# Patient Record
Sex: MALE | Race: BLACK OR AFRICAN AMERICAN | Employment: OTHER | URBAN - METROPOLITAN AREA
[De-identification: names, ages, dates, MRNs, and addresses within clinical notes are randomized per-mention and may not be internally consistent; named-entity substitution may affect disease eponyms.]

---

## 2020-07-17 LAB — MICROALBUMIN UR TEST STR-MCNC: 21.2 MG/DL

## 2020-09-08 ENCOUNTER — OFFICE VISIT (OUTPATIENT)
Dept: ENDOCRINOLOGY | Age: 70
End: 2020-09-08
Payer: MEDICARE

## 2020-09-08 VITALS
SYSTOLIC BLOOD PRESSURE: 141 MMHG | HEART RATE: 96 BPM | BODY MASS INDEX: 31.19 KG/M2 | WEIGHT: 205.8 LBS | DIASTOLIC BLOOD PRESSURE: 92 MMHG | HEIGHT: 68 IN | OXYGEN SATURATION: 93 % | RESPIRATION RATE: 18 BRPM | TEMPERATURE: 98.1 F

## 2020-09-08 DIAGNOSIS — E78.2 MIXED HYPERLIPIDEMIA: ICD-10-CM

## 2020-09-08 DIAGNOSIS — E11.65 TYPE 2 DIABETES MELLITUS WITH HYPERGLYCEMIA, WITHOUT LONG-TERM CURRENT USE OF INSULIN (HCC): Primary | ICD-10-CM

## 2020-09-08 DIAGNOSIS — I10 ESSENTIAL HYPERTENSION: ICD-10-CM

## 2020-09-08 PROCEDURE — G8536 NO DOC ELDER MAL SCRN: HCPCS | Performed by: INTERNAL MEDICINE

## 2020-09-08 PROCEDURE — G8417 CALC BMI ABV UP PARAM F/U: HCPCS | Performed by: INTERNAL MEDICINE

## 2020-09-08 PROCEDURE — 3017F COLORECTAL CA SCREEN DOC REV: CPT | Performed by: INTERNAL MEDICINE

## 2020-09-08 PROCEDURE — 3046F HEMOGLOBIN A1C LEVEL >9.0%: CPT | Performed by: INTERNAL MEDICINE

## 2020-09-08 PROCEDURE — G8427 DOCREV CUR MEDS BY ELIG CLIN: HCPCS | Performed by: INTERNAL MEDICINE

## 2020-09-08 PROCEDURE — G8510 SCR DEP NEG, NO PLAN REQD: HCPCS | Performed by: INTERNAL MEDICINE

## 2020-09-08 PROCEDURE — 1101F PT FALLS ASSESS-DOCD LE1/YR: CPT | Performed by: INTERNAL MEDICINE

## 2020-09-08 PROCEDURE — 99204 OFFICE O/P NEW MOD 45 MIN: CPT | Performed by: INTERNAL MEDICINE

## 2020-09-08 PROCEDURE — 2022F DILAT RTA XM EVC RTNOPTHY: CPT | Performed by: INTERNAL MEDICINE

## 2020-09-08 RX ORDER — BLOOD SUGAR DIAGNOSTIC
STRIP MISCELLANEOUS
Qty: 100 STRIP | Refills: 3 | Status: SHIPPED | OUTPATIENT
Start: 2020-09-08 | End: 2022-07-07 | Stop reason: SDUPTHER

## 2020-09-08 RX ORDER — LANCETS 33 GAUGE
EACH MISCELLANEOUS
Qty: 100 LANCET | Refills: 3 | Status: SHIPPED | OUTPATIENT
Start: 2020-09-08

## 2020-09-08 NOTE — PROGRESS NOTES
Kt Cordero MD          Patient Information  Date:9/8/2020  Name : Josefa Blanco. 72 y.o.     YOB: 1950         Referred by: Katie Sheridan MD         Chief Complaint   Patient presents with    New Patient    Diabetes       History of Present Illness: Josefa Nevarez is a 72 y.o. male here for initial visit of  Type 2 Diabetes Mellitus. Type 2 Diabetes was diagnosed 2015. End organ effects of diabetes: none. Cardiovascular risk factors: dyslipidemia, diabetes mellitus   Monitoring frequency: None/day and readings run not applicable  Last O7S was 7 in July 2020, blood glucose was 250 and symptoms include no polyuria, polydipsia  Hypoglycemia: no  He is on metformin 5 mg twice daily, Januvia  Weight trend: stable  Prior visit with dietician: no  Current diet: \"healthy\" diet  , changed it recently after falling to have hyperglycemia, he has quit sodas    Current exercise: Resume soon  No chest pain, shortness of breath, blurred vision      Hypothyroidism: On levothyroxine    Wt Readings from Last 3 Encounters:   09/08/20 205 lb 12.8 oz (93.4 kg)   08/09/16 202 lb (91.6 kg)   10/24/13 202 lb 5 oz (91.8 kg)       BP Readings from Last 3 Encounters:   09/08/20 (!) 141/92   08/09/16 (!) 122/96   10/24/13 130/70           Past Medical History:   Diagnosis Date    Burning with urination     [facility] [address]     Diabetes mellitus type 2 with complications, uncontrolled [facility] [address]     Hypercholesterolemia     Hyperlipidemia, mixed     Hypertension     Hypothyroidism     Long term (current) use of anticoagulants     Thyroid disease      Current Outpatient Medications   Medication Sig    sitagliptin phosphate (JANUVIA PO) Take  by mouth.  aspirin 81 mg chewable tablet Take 81 mg by mouth daily.  levothyroxine (SYNTHROID) 150 mcg tablet Take 150 mcg by mouth Daily (before breakfast).  0.15 mg    lisinopril-hydrochlorothiazide (PRINZIDE, ZESTORETIC) 20-25 mg per tablet Take 1 Tab by mouth daily.  atorvastatin (LIPITOR) 40 mg tablet Take 40 mg by mouth daily.  metFORMIN (GLUCOPHAGE) 500 mg tablet Take 500 mg by mouth two (2) times a day.  glucose blood VI test strips (OneTouch Verio test strips) strip Test once daily Dx Code: E11.65    lancets (OneTouch Delica Plus Lancet) 33 gauge misc Test once daily Dx Code: E11.65    finasteride (PROSCAR) 5 mg tablet Take 5 mg by mouth daily.  TAMSULOSIN HCL (FLOMAX PO) Take 0.4 mg by mouth two (2) times a day. No current facility-administered medications for this visit. Allergies   Allergen Reactions    Aspirin Nausea Only    Nsaids (Non-Steroidal Anti-Inflammatory Drug) Nausea Only    Oxycodone Itching       Review of Systems:  All 10 systems reviewed and are negative other than mentioned in HPI    Physical Examination:   Blood pressure (!) 141/92, pulse 96, temperature 98.1 °F (36.7 °C), temperature source Oral, resp. rate 18, height 5' 8\" (1.727 m), weight 205 lb 12.8 oz (93.4 kg), SpO2 93 %. Estimated body mass index is 31.29 kg/m² as calculated from the following:    Height as of this encounter: 5' 8\" (1.727 m). -   Weight as of this encounter: 205 lb 12.8 oz (93.4 kg).   - General: pleasant, no distress, good eye contact  - HEENT: no pallor, no periorbital edema, EOMI  - Neck: supple, no thyromegaly, no nodules  - Cardiovascular: regular,  normal S1 and S2, no murmurs  - Respiratory: clear to auscultation bilaterally  - Gastrointestinal: soft, nontender, nondistended,  BS +  - Musculoskeletal: no proximal muscle weakness in upper or lower extremities  - Integumentary: no acanthosis nigricans,no edema,   - Neurological: alert and oriented  - Psychiatric: normal mood and affect  - Skin: color, texture, turgor normal.           Data Reviewed:       Lab Results   Component Value Date/Time    Glucose 144 (H) 06/13/2016 02:16 PM    Glucose (POC) 125 (H) 10/24/2013 09:30 AM Creatinine 1.32 (H) 06/13/2016 02:16 PM      Lab Results   Component Value Date/Time    GFR est non-AA 54 (L) 06/13/2016 02:16 PM    GFR est AA >60 06/13/2016 02:16 PM    Creatinine 1.32 (H) 06/13/2016 02:16 PM    BUN 12 06/13/2016 02:16 PM    Sodium 140 06/13/2016 02:16 PM    Potassium 3.1 (L) 06/13/2016 02:16 PM    Chloride 104 06/13/2016 02:16 PM    CO2 28 06/13/2016 02:16 PM       Assessment/Plan:     1. Type 2 diabetes mellitus with hyperglycemia, without long-term current use of insulin ([facility] [address]    2. Essential hypertension    3. Mixed hyperlipidemia        1. Type 2 Diabetes Mellitus   No results found for: HBA1C, HGBE8, VPC2WTMN, MFR8GDQA, FQL0OFLJ  Discussed the pathophysiology, he has already made changes to the diet which was commended  Increase activity  Continue the same medication metformin, Januvia  Goals of the blood glucose discussed, meter dispensed, to call in 2 weeks if above goal  Advised to check glucose 2  times daily    Diabetic issues reviewed : glycemic goals , written exchange diet given, low carbohydrate diet, weight control , home glucose monitoring emphasized,  hypoglycemia management and long term diabetic complications discussed. FLU annually ,Pneumovax ,aspirin daily,annual eye exam,microalbumin    2. HTN : Continue current therapy     3. Hyperlipidemia : Continue statin. 4.Obesity:Body mass index is 31.29 kg/m². Discussed about the importance of exercise and carbohydrate portion control. 5.  Hypothyroidism      There are no Patient Instructions on file for this visit. Follow-up and Dispositions    · Return in about 3 months (around 12/8/2020) for labs before next visit and follow up. Thank you for allowing me to participate in the care of this patient. Shelia Mercedes MD      Patient verbalized understanding     Voice-recognition software was used to generate this report, which may result in some phonetic-based errors in the grammar and contents.   Even though attempts were made to correct all the mistakes, some may have been missed and remained in the body of the report.

## 2020-09-08 NOTE — PROGRESS NOTES
1. Have you been to the ER, urgent care clinic since your last visit? No  Hospitalized since your last visit? No    2. Have you seen or consulted any other health care providers outside of the [address] [place] [address] since your last visit? Include any pap smears or colon screening. No    Wt Readings from Last 3 Encounters:   09/08/20 205 lb 12.8 oz (93.4 kg)   08/09/16 202 lb (91.6 kg)   10/24/13 202 lb 5 oz (91.8 kg)     Temp Readings from Last 3 Encounters:   09/08/20 98.1 °F (36.7 °C) (Oral)   08/09/16 97.6 °F (36.4 °C)   10/24/13 98.1 °F (36.7 °C)     BP Readings from Last 3 Encounters:   09/08/20 (!) 141/92   08/09/16 (!) 122/96   10/24/13 130/70     Pulse Readings from Last 3 Encounters:   09/08/20 96   08/09/16 77   10/24/13 (!) 58     Patient does not have a meter.

## 2020-09-08 NOTE — LETTER
9/8/20 Patient: Jamey Mazariegos. YOB: 1950 Date of Visit: 9/8/2020 Sravani Tolentino MD 
[address] [place] [address] VIA Facsimile: [phone] Dear Sravani Tolentino MD, Thank you for referring Mr. Kaia Lamas to [address] [place] [address] for evaluation. My notes for this consultation are attached. If you have questions, please do not hesitate to call me. I look forward to following your patient along with you. Sincerely, Kim Gallego MD 
 
 Increased latency

## 2020-12-09 ENCOUNTER — OFFICE VISIT (OUTPATIENT)
Dept: ENDOCRINOLOGY | Age: 70
End: 2020-12-09
Payer: MEDICARE

## 2020-12-09 VITALS
HEIGHT: 68 IN | DIASTOLIC BLOOD PRESSURE: 88 MMHG | OXYGEN SATURATION: 98 % | TEMPERATURE: 97.5 F | HEART RATE: 91 BPM | RESPIRATION RATE: 16 BRPM | SYSTOLIC BLOOD PRESSURE: 136 MMHG | WEIGHT: 205 LBS | BODY MASS INDEX: 31.07 KG/M2

## 2020-12-09 DIAGNOSIS — I10 ESSENTIAL HYPERTENSION: ICD-10-CM

## 2020-12-09 DIAGNOSIS — E11.65 TYPE 2 DIABETES MELLITUS WITH HYPERGLYCEMIA, WITHOUT LONG-TERM CURRENT USE OF INSULIN (HCC): Primary | ICD-10-CM

## 2020-12-09 DIAGNOSIS — E78.2 MIXED HYPERLIPIDEMIA: ICD-10-CM

## 2020-12-09 PROCEDURE — 99214 OFFICE O/P EST MOD 30 MIN: CPT | Performed by: INTERNAL MEDICINE

## 2020-12-09 PROCEDURE — 2022F DILAT RTA XM EVC RTNOPTHY: CPT | Performed by: INTERNAL MEDICINE

## 2020-12-09 PROCEDURE — 3046F HEMOGLOBIN A1C LEVEL >9.0%: CPT | Performed by: INTERNAL MEDICINE

## 2020-12-09 PROCEDURE — 1101F PT FALLS ASSESS-DOCD LE1/YR: CPT | Performed by: INTERNAL MEDICINE

## 2020-12-09 PROCEDURE — G8417 CALC BMI ABV UP PARAM F/U: HCPCS | Performed by: INTERNAL MEDICINE

## 2020-12-09 PROCEDURE — 3017F COLORECTAL CA SCREEN DOC REV: CPT | Performed by: INTERNAL MEDICINE

## 2020-12-09 PROCEDURE — G8427 DOCREV CUR MEDS BY ELIG CLIN: HCPCS | Performed by: INTERNAL MEDICINE

## 2020-12-09 PROCEDURE — G8536 NO DOC ELDER MAL SCRN: HCPCS | Performed by: INTERNAL MEDICINE

## 2020-12-09 PROCEDURE — G8432 DEP SCR NOT DOC, RNG: HCPCS | Performed by: INTERNAL MEDICINE

## 2020-12-09 RX ORDER — SITAGLIPTIN AND METFORMIN HYDROCHLORIDE 100; 1000 MG/1; MG/1
1 TABLET, FILM COATED, EXTENDED RELEASE ORAL DAILY
Qty: 90 TAB | Refills: 3 | Status: SHIPPED | OUTPATIENT
Start: 2020-12-09 | End: 2021-10-07 | Stop reason: SDUPTHER

## 2020-12-09 NOTE — PROGRESS NOTES
Mona Hoyt. is a 72 y.o. male here for   Chief Complaint   Patient presents with    Diabetes       1. Have you been to the ER, urgent care clinic since your last visit? Hospitalized since your last visit? -no    2. Have you seen or consulted any other health care providers outside of the [address] [place] [address] since your last visit? Include any pap smears or colon screening. -PCP

## 2020-12-09 NOTE — PROGRESS NOTES
Marce Thompson MD          Patient Information  Date:12/11/2020  Name : Bree Lisa. 72 y.o.     YOB: 1950         Referred by: Porsche Douglas MD         Chief Complaint   Patient presents with    Diabetes       History of Present Illness: Bree Maldonado is a 72 y.o. male here for follow-up of  Type 2 Diabetes Mellitus. Type 2 Diabetes was diagnosed 2015. End organ effects of diabetes: none. Cardiovascular risk factors: dyslipidemia, diabetes mellitus   Monitoring frequency: None/day and readings run not applicable  He has changed the diet, no weight loss  Blood glucose has improved  He is on metformin and Januvia  He has quit sodas    No chest pain, shortness of breath, blurred vision      Hypothyroidism: On levothyroxine    Wt Readings from Last 3 Encounters:   12/09/20 205 lb (93 kg)   09/08/20 205 lb 12.8 oz (93.4 kg)   08/09/16 202 lb (91.6 kg)       BP Readings from Last 3 Encounters:   12/09/20 136/88   09/08/20 (!) 141/92   08/09/16 (!) 122/96           Past Medical History:   Diagnosis Date    Burning with urination     COVID-19     Diabetes ([facility] [address]     Diabetes mellitus type 2 with complications, uncontrolled ([facility] [address]     Hypercholesterolemia     Hyperlipidemia, mixed     Hypertension     Hypothyroidism     Long term (current) use of anticoagulants     Thyroid disease      Current Outpatient Medications   Medication Sig    glucose blood VI test strips (OneTouch Verio test strips) strip Test once daily Dx Code: E11.65    lancets (OneTouch Delica Plus Lancet) 33 gauge misc Test once daily Dx Code: E11.65    aspirin 81 mg chewable tablet Take 81 mg by mouth daily.  levothyroxine (SYNTHROID) 150 mcg tablet Take 150 mcg by mouth Daily (before breakfast). 0.15 mg    lisinopril-hydrochlorothiazide (PRINZIDE, ZESTORETIC) 20-25 mg per tablet Take 1 Tab by mouth daily.     atorvastatin (LIPITOR) 40 mg tablet Take 40 mg by mouth daily.  SITagliptin-metFORMIN (Janumet XR) 100-1,000 mg TM24 Take 1 Tab by mouth daily. Stop Januvia and Metformin    finasteride (PROSCAR) 5 mg tablet Take 5 mg by mouth daily.  TAMSULOSIN HCL (FLOMAX PO) Take 0.4 mg by mouth two (2) times a day. No current facility-administered medications for this visit. Allergies   Allergen Reactions    Aspirin Nausea Only    Nsaids (Non-Steroidal Anti-Inflammatory Drug) Nausea Only    Oxycodone Itching       Review of Systems:  All 10 systems reviewed and are negative other than mentioned in HPI    Physical Examination:   Blood pressure 136/88, pulse 91, temperature 97.5 °F (36.4 °C), temperature source Oral, resp. rate 16, height 5' 8\" (1.727 m), weight 205 lb (93 kg), SpO2 98 %. Estimated body mass index is 31.17 kg/m² as calculated from the following:    Height as of this encounter: 5' 8\" (1.727 m). -   Weight as of this encounter: 205 lb (93 kg). - General: pleasant, no distress, good eye contact  - HEENT: no exophthalmos, no periorbital edema, EOMI  - Neck: No visible thyromegaly  - RS: Normal respiratory effort  - Musculoskeletal: no tremors  - Neurological: alert and oriented  - Psychiatric: normal mood and affect  - Skin: Normal color          Data Reviewed:       Lab Results   Component Value Date/Time    Glucose 144 (H) 06/13/2016 02:16 PM    Glucose (POC) 125 (H) 10/24/2013 09:30 AM    Creatinine 1.32 (H) 06/13/2016 02:16 PM      Lab Results   Component Value Date/Time    GFR est non-AA 54 (L) 06/13/2016 02:16 PM    GFR est AA >60 06/13/2016 02:16 PM    Creatinine 1.32 (H) 06/13/2016 02:16 PM    BUN 12 06/13/2016 02:16 PM    Sodium 140 06/13/2016 02:16 PM    Potassium 3.1 (L) 06/13/2016 02:16 PM    Chloride 104 06/13/2016 02:16 PM    CO2 28 06/13/2016 02:16 PM       Assessment/Plan:     No diagnosis found.     1. Type 2 Diabetes Mellitus  A1c less than 7  No results found for: HBA1C, HGBE8, XZI1DFXE, NZL8YTNR, [id number]  Janumet extended release  Commended on changing diet      2. HTN : Continue current therapy     3. Hyperlipidemia : Continue statin. 4.Obesity:Body mass index is 31.17 kg/m². Discussed about the importance of exercise and carbohydrate portion control. 5.  Hypothyroidism      There are no Patient Instructions on file for this visit. Follow-up and Dispositions    · Return in about 4 months (around 4/9/2021) for labs before next visit and follow up. Thank you for allowing me to participate in the care of this patient. Rey Newby MD      Patient verbalized understanding     Voice-recognition software was used to generate this report, which may result in some phonetic-based errors in the grammar and contents. Even though attempts were made to correct all the mistakes, some may have been missed and remained in the body of the report.

## 2020-12-09 NOTE — LETTER
12/11/20 Patient: Bree Lisa. YOB: 1950 Date of Visit: 12/9/2020 Matthew Camacho MD 
[address] [place] [address] VIA Facsimile: [phone] Dear Matthew Camacho MD, Thank you for referring Mr. Jaja Taylor to [address] [place] [address] for evaluation. My notes for this consultation are attached. If you have questions, please do not hesitate to call me. I look forward to following your patient along with you. Sincerely, Wilfredo Andrews MD

## 2020-12-11 PROBLEM — I10 ESSENTIAL HYPERTENSION: Status: ACTIVE | Noted: 2020-12-11

## 2020-12-11 PROBLEM — E11.65 TYPE 2 DIABETES MELLITUS WITH HYPERGLYCEMIA, WITHOUT LONG-TERM CURRENT USE OF INSULIN (HCC): Status: ACTIVE | Noted: 2020-12-11

## 2020-12-11 PROBLEM — E78.2 MIXED HYPERLIPIDEMIA: Status: ACTIVE | Noted: 2020-12-11

## 2021-01-13 ENCOUNTER — TELEPHONE (OUTPATIENT)
Dept: ENDOCRINOLOGY | Age: 71
End: 2021-01-13

## 2021-01-13 NOTE — TELEPHONE ENCOUNTER
Pt states he thought about it and he remembered to replace januvia and metformin with janumet.  Confirmed dose of one tablet in the AM.

## 2021-01-13 NOTE — TELEPHONE ENCOUNTER
Patient would like to discuss Januvia and Janumet.  He just received the Janumet through mail order but has been on Januvia wanted to clarify the change

## 2021-03-24 LAB
CREATININE, EXTERNAL: 1.05
HBA1C MFR BLD HPLC: 7.2 %
LDL-C, EXTERNAL: 64
PSA, EXTERNAL: 0.3

## 2021-06-02 ENCOUNTER — OFFICE VISIT (OUTPATIENT)
Dept: ENDOCRINOLOGY | Age: 71
End: 2021-06-02
Payer: MEDICARE

## 2021-06-02 VITALS
HEART RATE: 90 BPM | DIASTOLIC BLOOD PRESSURE: 93 MMHG | WEIGHT: 212 LBS | BODY MASS INDEX: 32.13 KG/M2 | SYSTOLIC BLOOD PRESSURE: 151 MMHG | OXYGEN SATURATION: 97 % | TEMPERATURE: 96.8 F | HEIGHT: 68 IN

## 2021-06-02 DIAGNOSIS — I10 ESSENTIAL HYPERTENSION: ICD-10-CM

## 2021-06-02 DIAGNOSIS — E11.65 TYPE 2 DIABETES MELLITUS WITH HYPERGLYCEMIA, WITHOUT LONG-TERM CURRENT USE OF INSULIN (HCC): Primary | ICD-10-CM

## 2021-06-02 DIAGNOSIS — E78.2 MIXED HYPERLIPIDEMIA: ICD-10-CM

## 2021-06-02 PROCEDURE — G8510 SCR DEP NEG, NO PLAN REQD: HCPCS | Performed by: INTERNAL MEDICINE

## 2021-06-02 PROCEDURE — G8755 DIAS BP > OR = 90: HCPCS | Performed by: INTERNAL MEDICINE

## 2021-06-02 PROCEDURE — 3051F HG A1C>EQUAL 7.0%<8.0%: CPT | Performed by: INTERNAL MEDICINE

## 2021-06-02 PROCEDURE — 1101F PT FALLS ASSESS-DOCD LE1/YR: CPT | Performed by: INTERNAL MEDICINE

## 2021-06-02 PROCEDURE — G8417 CALC BMI ABV UP PARAM F/U: HCPCS | Performed by: INTERNAL MEDICINE

## 2021-06-02 PROCEDURE — G8753 SYS BP > OR = 140: HCPCS | Performed by: INTERNAL MEDICINE

## 2021-06-02 PROCEDURE — G8427 DOCREV CUR MEDS BY ELIG CLIN: HCPCS | Performed by: INTERNAL MEDICINE

## 2021-06-02 PROCEDURE — G8536 NO DOC ELDER MAL SCRN: HCPCS | Performed by: INTERNAL MEDICINE

## 2021-06-02 PROCEDURE — 3017F COLORECTAL CA SCREEN DOC REV: CPT | Performed by: INTERNAL MEDICINE

## 2021-06-02 PROCEDURE — 99214 OFFICE O/P EST MOD 30 MIN: CPT | Performed by: INTERNAL MEDICINE

## 2021-06-02 PROCEDURE — 2022F DILAT RTA XM EVC RTNOPTHY: CPT | Performed by: INTERNAL MEDICINE

## 2021-06-02 NOTE — PROGRESS NOTES
Angy Chandra MD          Patient Information  Date:6/2/2021  Name : Yehuda Herr 80 y.o.     YOB: 1950         Referred by: Cj Estrada MD         Chief Complaint   Patient presents with    Diabetes       History of Present Illness: Yehuda Herr is a 80 y.o. male here for follow-up of  Type 2 Diabetes Mellitus. Type 2 Diabetes was diagnosed 2015. End organ effects of diabetes: none. Cardiovascular risk factors: dyslipidemia, diabetes mellitus   He has gained weight, stop  walking on a treadmill as he was busy    He is on metformin and Januvia  He has quit sodas    No chest pain, shortness of breath, blurred vision      Hypothyroidism: On levothyroxine    Wt Readings from Last 3 Encounters:   06/02/21 212 lb (96.2 kg)   12/09/20 205 lb (93 kg)   09/08/20 205 lb 12.8 oz (93.4 kg)       BP Readings from Last 3 Encounters:   06/02/21 (!) 151/93   12/09/20 136/88   09/08/20 (!) 141/92           Past Medical History:   Diagnosis Date    Burning with urination     COVID-19     [facility] [address]     Diabetes mellitus type 2 with complications, uncontrolled ([facility] [address]     Hypercholesterolemia     Hyperlipidemia, mixed     Hypertension     Hypothyroidism     Long term (current) use of anticoagulants     Thyroid disease      Current Outpatient Medications   Medication Sig    SITagliptin-metFORMIN (Janumet XR) 100-1,000 mg TM24 Take 1 Tab by mouth daily. Stop Januvia and Metformin    glucose blood VI test strips (OneTouch Verio test strips) strip Test once daily Dx Code: E11.65    lancets (OneTouch Delica Plus Lancet) 33 gauge misc Test once daily Dx Code: E11.65    aspirin 81 mg chewable tablet Take 81 mg by mouth daily.  levothyroxine (SYNTHROID) 150 mcg tablet Take 150 mcg by mouth Daily (before breakfast). 0.15 mg    lisinopril-hydrochlorothiazide (PRINZIDE, ZESTORETIC) 20-25 mg per tablet Take 1 Tab by mouth daily.  atorvastatin (LIPITOR) 40 mg tablet Take 40 mg by mouth daily.  finasteride (PROSCAR) 5 mg tablet Take 5 mg by mouth daily. (Patient not taking: Reported on 6/2/2021)    TAMSULOSIN HCL (FLOMAX PO) Take 0.4 mg by mouth two (2) times a day. (Patient not taking: Reported on 6/2/2021)     No current facility-administered medications for this visit. Allergies   Allergen Reactions    Aspirin Nausea Only    Nsaids (Non-Steroidal Anti-Inflammatory Drug) Nausea Only    Oxycodone Itching       Review of Systems:  All 10 systems reviewed and are negative other than mentioned in HPI    Physical Examination:   Blood pressure (!) 151/93, pulse 90, temperature 96.8 °F (36 °C), height 5' 8\" (1.727 m), weight 212 lb (96.2 kg), SpO2 97 %. Estimated body mass index is 32.23 kg/m² as calculated from the following:    Height as of this encounter: 5' 8\" (1.727 m). -   Weight as of this encounter: 212 lb (96.2 kg). - General: pleasant, no distress, good eye contact  - HEENT: no exophthalmos, no periorbital edema, EOMI  - Neck: No thyromegaly  - CVS: S1-S2 regular  - RS: Normal respiratory effort  - Musculoskeletal: no tremors  - Neurological: alert and oriented  - Psychiatric: normal mood and affect  - Skin: Normal color          Data Reviewed:       Lab Results   Component Value Date/Time    Hemoglobin A1c, External 7.2 03/24/2021 12:00 AM    Hemoglobin A1c, External 6.8 10/28/2020 12:00 AM    Glucose 144 (H) 06/13/2016 02:16 PM    Glucose (POC) 125 (H) 10/24/2013 09:30 AM    Creatinine 1.32 (H) 06/13/2016 02:16 PM      Lab Results   Component Value Date/Time    GFR est non-AA 54 (L) 06/13/2016 02:16 PM    GFR est AA >60 06/13/2016 02:16 PM    Creatinine 1.32 (H) 06/13/2016 02:16 PM    BUN 12 06/13/2016 02:16 PM    Sodium 140 06/13/2016 02:16 PM    Potassium 3.1 (L) 06/13/2016 02:16 PM    Chloride 104 06/13/2016 02:16 PM    CO2 28 06/13/2016 02:16 PM       Assessment/Plan:     1.  Type 2 diabetes mellitus with hyperglycemia, without long-term current use of insulin ([facility] [address]        1. Type 2 Diabetes Mellitus    Lab Results   Component Value Date/Time    Hemoglobin A1c, External 7.2 03/24/2021 12:00 AM     Janumet extended release  Will resume exercise     2. HTN : Continue current therapy     3. Hyperlipidemia : Continue statin. 4.Obesity:Body mass index is 32.23 kg/m². Discussed about the importance of exercise and carbohydrate portion control. 5.  Hypothyroidism      There are no Patient Instructions on file for this visit. Thank you for allowing me to participate in the care of this patient. Stephanie Kemp MD      Patient verbalized understanding     Voice-recognition software was used to generate this report, which may result in some phonetic-based errors in the grammar and contents. Even though attempts were made to correct all the mistakes, some may have been missed and remained in the body of the report.

## 2021-06-02 NOTE — LETTER
6/2/2021 Patient: Lexx Aleman. YOB: 1950 Date of Visit: 6/2/2021 Jessicarzoila Carrasco MD 
[address] [place] [address] Via Fax: [phone] Dear Taylor Carrasco MD, Thank you for referring Mr. Hiro Hdez to [address] [place] [address] for evaluation. My notes for this consultation are attached. If you have questions, please do not hesitate to call me. I look forward to following your patient along with you. Sincerely, Eladia Marshall MD

## 2021-08-04 LAB
CREATININE, EXTERNAL: 1.01
HBA1C MFR BLD HPLC: 7.7 %
LDL-C, EXTERNAL: 76

## 2021-08-26 ENCOUNTER — HOSPITAL ENCOUNTER (OUTPATIENT)
Dept: PREADMISSION TESTING | Age: 71
Discharge: HOME | End: 2021-08-26
Payer: MEDICARE

## 2021-08-26 ENCOUNTER — TRANSCRIBE ORDER (OUTPATIENT)
Dept: REGISTRATION | Age: 71
End: 2021-08-26

## 2021-08-26 DIAGNOSIS — Z01.812 PRE-PROCEDURE LAB EXAM: ICD-10-CM

## 2021-08-26 DIAGNOSIS — Z01.812 PRE-PROCEDURE LAB EXAM: Primary | ICD-10-CM

## 2021-08-26 PROCEDURE — U0005 INFEC AGEN DETEC AMPLI PROBE: HCPCS

## 2021-08-27 LAB
SARS-COV-2, XPLCVT: NOT DETECTED
SOURCE, COVRS: NORMAL

## 2021-08-30 ENCOUNTER — ANESTHESIA EVENT (OUTPATIENT)
Dept: ENDOSCOPY | Age: 71
End: 2021-08-30
Payer: MEDICARE

## 2021-08-30 ENCOUNTER — HOSPITAL ENCOUNTER (OUTPATIENT)
Age: 71
Setting detail: OUTPATIENT SURGERY
Discharge: HOME | End: 2021-08-30
Attending: INTERNAL MEDICINE | Admitting: INTERNAL MEDICINE
Payer: MEDICARE

## 2021-08-30 ENCOUNTER — ANESTHESIA (OUTPATIENT)
Dept: ENDOSCOPY | Age: 71
End: 2021-08-30
Payer: MEDICARE

## 2021-08-30 VITALS
SYSTOLIC BLOOD PRESSURE: 105 MMHG | BODY MASS INDEX: 31.52 KG/M2 | OXYGEN SATURATION: 93 % | WEIGHT: 208 LBS | TEMPERATURE: 97.1 F | HEART RATE: 76 BPM | DIASTOLIC BLOOD PRESSURE: 74 MMHG | HEIGHT: 68 IN | RESPIRATION RATE: 25 BRPM

## 2021-08-30 LAB
GLUCOSE BLD STRIP.AUTO-MCNC: 137 MG/DL (ref 65–117)
SERVICE CMNT-IMP: ABNORMAL

## 2021-08-30 PROCEDURE — 76060000031 HC ANESTHESIA FIRST 0.5 HR: Performed by: INTERNAL MEDICINE

## 2021-08-30 PROCEDURE — 77030009426 HC FCPS BIOP ENDOSC BSC -B: Performed by: INTERNAL MEDICINE

## 2021-08-30 PROCEDURE — 74011250636 HC RX REV CODE- 250/636: Performed by: NURSE ANESTHETIST, CERTIFIED REGISTERED

## 2021-08-30 PROCEDURE — 77030013991 HC SNR POLYP ENDOSC BSC -A: Performed by: INTERNAL MEDICINE

## 2021-08-30 PROCEDURE — 88305 TISSUE EXAM BY PATHOLOGIST: CPT

## 2021-08-30 PROCEDURE — 74011000250 HC RX REV CODE- 250: Performed by: NURSE ANESTHETIST, CERTIFIED REGISTERED

## 2021-08-30 PROCEDURE — 2709999900 HC NON-CHARGEABLE SUPPLY: Performed by: INTERNAL MEDICINE

## 2021-08-30 PROCEDURE — 76040000019: Performed by: INTERNAL MEDICINE

## 2021-08-30 PROCEDURE — 82962 GLUCOSE BLOOD TEST: CPT

## 2021-08-30 PROCEDURE — 74011250637 HC RX REV CODE- 250/637: Performed by: INTERNAL MEDICINE

## 2021-08-30 PROCEDURE — 77030010936 HC CLP LIG BSC -C: Performed by: INTERNAL MEDICINE

## 2021-08-30 DEVICE — WORKING LENGTH 235CM, WORKING CHANNEL 2.8MM
Type: IMPLANTABLE DEVICE | Site: TRANSVERSE COLON | Status: FUNCTIONAL
Brand: RESOLUTION 360 CLIP

## 2021-08-30 RX ORDER — SODIUM CHLORIDE 0.9 % (FLUSH) 0.9 %
5-40 SYRINGE (ML) INJECTION EVERY 8 HOURS
Status: DISCONTINUED | OUTPATIENT
Start: 2021-08-30 | End: 2021-08-30 | Stop reason: HOSPADM

## 2021-08-30 RX ORDER — BRINZOLAMIDE 10 MG/ML
1 SUSPENSION/ DROPS OPHTHALMIC 2 TIMES DAILY
COMMUNITY
End: 2022-03-11

## 2021-08-30 RX ORDER — FENTANYL CITRATE 50 UG/ML
25-200 INJECTION, SOLUTION INTRAMUSCULAR; INTRAVENOUS
Status: DISCONTINUED | OUTPATIENT
Start: 2021-08-30 | End: 2021-08-30 | Stop reason: HOSPADM

## 2021-08-30 RX ORDER — SODIUM CHLORIDE 9 MG/ML
50 INJECTION, SOLUTION INTRAVENOUS CONTINUOUS
Status: DISCONTINUED | OUTPATIENT
Start: 2021-08-30 | End: 2021-08-30 | Stop reason: HOSPADM

## 2021-08-30 RX ORDER — ATROPINE SULFATE 0.1 MG/ML
0.5 INJECTION INTRAVENOUS
Status: DISCONTINUED | OUTPATIENT
Start: 2021-08-30 | End: 2021-08-30 | Stop reason: HOSPADM

## 2021-08-30 RX ORDER — NETARSUDIL AND LATANOPROST OPHTHALMIC SOLUTION, 0.02%/0.005% .2; .05 MG/ML; MG/ML
SOLUTION/ DROPS OPHTHALMIC; TOPICAL DAILY
COMMUNITY
End: 2022-03-11

## 2021-08-30 RX ORDER — SODIUM CHLORIDE 0.9 % (FLUSH) 0.9 %
5-40 SYRINGE (ML) INJECTION AS NEEDED
Status: DISCONTINUED | OUTPATIENT
Start: 2021-08-30 | End: 2021-08-30 | Stop reason: HOSPADM

## 2021-08-30 RX ORDER — SODIUM CHLORIDE 9 MG/ML
INJECTION, SOLUTION INTRAVENOUS
Status: DISCONTINUED | OUTPATIENT
Start: 2021-08-30 | End: 2021-08-30 | Stop reason: HOSPADM

## 2021-08-30 RX ORDER — EPINEPHRINE 0.1 MG/ML
1 INJECTION INTRACARDIAC; INTRAVENOUS
Status: DISCONTINUED | OUTPATIENT
Start: 2021-08-30 | End: 2021-08-30 | Stop reason: HOSPADM

## 2021-08-30 RX ORDER — DEXTROMETHORPHAN/PSEUDOEPHED 2.5-7.5/.8
1.2 DROPS ORAL
Status: DISCONTINUED | OUTPATIENT
Start: 2021-08-30 | End: 2021-08-30 | Stop reason: HOSPADM

## 2021-08-30 RX ORDER — BRIMONIDINE TARTRATE, TIMOLOL MALEATE 2; 5 MG/ML; MG/ML
1 SOLUTION/ DROPS OPHTHALMIC EVERY 12 HOURS
COMMUNITY
End: 2022-03-11

## 2021-08-30 RX ORDER — LIDOCAINE HYDROCHLORIDE 20 MG/ML
INJECTION, SOLUTION EPIDURAL; INFILTRATION; INTRACAUDAL; PERINEURAL AS NEEDED
Status: DISCONTINUED | OUTPATIENT
Start: 2021-08-30 | End: 2021-08-30 | Stop reason: HOSPADM

## 2021-08-30 RX ORDER — MIDAZOLAM HYDROCHLORIDE 1 MG/ML
.25-5 INJECTION, SOLUTION INTRAMUSCULAR; INTRAVENOUS
Status: DISCONTINUED | OUTPATIENT
Start: 2021-08-30 | End: 2021-08-30 | Stop reason: HOSPADM

## 2021-08-30 RX ORDER — PROPOFOL 10 MG/ML
INJECTION, EMULSION INTRAVENOUS AS NEEDED
Status: DISCONTINUED | OUTPATIENT
Start: 2021-08-30 | End: 2021-08-30 | Stop reason: HOSPADM

## 2021-08-30 RX ORDER — FLUMAZENIL 0.1 MG/ML
0.2 INJECTION INTRAVENOUS
Status: DISCONTINUED | OUTPATIENT
Start: 2021-08-30 | End: 2021-08-30 | Stop reason: HOSPADM

## 2021-08-30 RX ORDER — NALOXONE HYDROCHLORIDE 0.4 MG/ML
0.4 INJECTION, SOLUTION INTRAMUSCULAR; INTRAVENOUS; SUBCUTANEOUS
Status: DISCONTINUED | OUTPATIENT
Start: 2021-08-30 | End: 2021-08-30 | Stop reason: HOSPADM

## 2021-08-30 RX ADMIN — LIDOCAINE HYDROCHLORIDE 50 MG: 20 INJECTION, SOLUTION EPIDURAL; INFILTRATION; INTRACAUDAL; PERINEURAL at 09:06

## 2021-08-30 RX ADMIN — PROPOFOL 30 MG: 10 INJECTION, EMULSION INTRAVENOUS at 09:15

## 2021-08-30 RX ADMIN — SODIUM CHLORIDE: 900 INJECTION, SOLUTION INTRAVENOUS at 09:00

## 2021-08-30 RX ADMIN — PROPOFOL 30 MG: 10 INJECTION, EMULSION INTRAVENOUS at 09:11

## 2021-08-30 RX ADMIN — PROPOFOL 30 MG: 10 INJECTION, EMULSION INTRAVENOUS at 09:07

## 2021-08-30 RX ADMIN — PROPOFOL 30 MG: 10 INJECTION, EMULSION INTRAVENOUS at 09:12

## 2021-08-30 RX ADMIN — PROPOFOL 30 MG: 10 INJECTION, EMULSION INTRAVENOUS at 09:08

## 2021-08-30 RX ADMIN — PROPOFOL 20 MG: 10 INJECTION, EMULSION INTRAVENOUS at 09:18

## 2021-08-30 RX ADMIN — PROPOFOL 50 MG: 10 INJECTION, EMULSION INTRAVENOUS at 09:06

## 2021-08-30 RX ADMIN — PROPOFOL 30 MG: 10 INJECTION, EMULSION INTRAVENOUS at 09:09

## 2021-08-30 NOTE — DISCHARGE INSTRUCTIONS
[address]    COLON DISCHARGE INSTRUCTIONS    Derick Lugo.  [id number]  1950    Discomfort:  Redness at IV site- apply warm compress to area; if redness or soreness persist- contact your physician  There may be a slight amount of blood passed from the rectum  Gaseous discomfort- walking, belching will help relieve any discomfort  You may not operate a vehicle for 12 hours  You may not engage in an occupation involving machinery or appliances for rest of today  You may not drink alcoholic beverages for at least 12 hours  Avoid making any critical decisions for at least 24 hour  DIET:  You may resume your regular diet - however -  remember your colon is empty and a heavy meal will produce gas. Avoid these foods:  vegetables, fried / greasy foods, carbonated drinks     ACTIVITY:  You may  resume your normal daily activities it is recommended that you spend the remainder of the day resting -  avoid any strenuous activity. CALL M.D.   ANY SIGN OF:   Increasing pain, nausea, vomiting  Abdominal distension (swelling)  New increased bleeding (oral or rectal)  Fever (chills)  Pain in chest area  Bloody discharge from nose or mouth  Shortness of breath      Follow-up Instructions:   Call Dr. Wil Fan for any questions or problems at [phone]   High fiber diet  Resume aspirin on 9/1/2021          ENDOSCOPY FINDINGS:   Your colonoscopy showed 2 polyps removed and diverticulosis, rest of exam was normal.  Telephone # [phone]      Signed By: Wil Fan MD     8/30/2021  9:37 AM       DISCHARGE SUMMARY from Nurse    The following personal items collected during your admission are returned to you:   Dental Appliance: Dental Appliances: None  Vision: Visual Aid: Glasses, At bedside  Hearing Aid:    Jewelry:    Clothing:    Other Valuables:    Valuables sent to safe:        Learning About Coronavirus (COVID-19)  Coronavirus (COVID-19): Overview  What is coronavirus ([id number])? The coronavirus disease (COVID-19) is caused by a virus. It is an illness that [facility] [place]. It has since spread worldwide. The virus can cause fever, cough, and trouble breathing. In severe cases, it can cause pneumonia and make it hard to breathe without help. It can cause death. Coronaviruses are a large group of viruses. They cause the common cold. They also cause more serious illnesses like Middle East respiratory syndrome (MERS) and severe acute respiratory syndrome (SARS). COVID-19 is caused by a novel coronavirus. That means it's a new type that has not been seen in people before. This virus spreads person-to-person through droplets from coughing and sneezing. It can also spread when you are close to someone who is infected. And it can spread when you touch something that has the virus on it, such as a doorknob or a tabletop. What can you do to protect yourself from coronavirus (COVID-19)? The best way to protect yourself from getting sick is to:  · Avoid areas where there is an outbreak. · Avoid contact with people who may be infected. · Wash your hands often with soap or alcohol-based hand sanitizers. · Avoid crowds and try to stay at least 6 feet away from other people. · Wash your hands often, especially after you cough or sneeze. Use soap and water, and scrub for at least 20 seconds. If soap and water aren't available, use an alcohol-based hand . · Avoid touching your mouth, nose, and eyes. What can you do to avoid spreading the virus to others? To help avoid spreading the virus to others:  · Cover your mouth with a tissue when you cough or sneeze. Then throw the tissue in the trash. · Use a disinfectant to clean things that you touch often. · Stay home if you are sick or have been exposed to the virus. Don't go to school, work, or public areas. And don't use public transportation.   · If you are sick:  ? Leave your home only if you need to get medical care. But call the doctor's office first so they know you're coming. And wear a face mask, if you have one.  ? If you have a face mask, wear it whenever you're around other people. It can help stop the spread of the virus when you cough or sneeze. ? Clean and disinfect your home every day. Use household  and disinfectant wipes or sprays. Take special care to clean things that you grab with your hands. These include doorknobs, remote controls, phones, and handles on your refrigerator and microwave. And don't forget countertops, tabletops, bathrooms, and computer keyboards. When to call for help  Call 911 anytime you think you may need emergency care. For example, call if:  · You have severe trouble breathing. (You can't talk at all.)  · You have constant chest pain or pressure. · You are severely dizzy or lightheaded. · You are confused or can't think clearly. · Your face and lips have a blue color. · You pass out (lose consciousness) or are very hard to wake up. Call your doctor now if you develop symptoms such as:  · Shortness of breath. · Fever. · Cough. If you need to get care, call ahead to the doctor's office for instructions before you go. Make sure you wear a face mask, if you have one, to prevent exposing other people to the virus. Where can you get the latest information? The following health organizations are tracking and studying this virus. Their websites contain the most up-to-date information. Alex Erazo also learn what to do if you think you may have been exposed to the virus. · U.S. Centers for Disease Control and Prevention (CDC): The CDC provides updated news about the disease and travel advice. The website also tells you how to prevent the spread of infection. www.cdc.gov  · World Health Organization [facility]): WHO offers information about the virus outbreaks.  WHO also has travel advice. www.who.int  Current as of: April 1, 2020               Content Version: 12.4  © 0169-2447 Healthwise, Incorporated. Care instructions adapted under license by your healthcare professional. If you have questions about a medical condition or this instruction, always ask your healthcare professional. [address] any warranty or liability for your use of this information.

## 2021-08-30 NOTE — PROCEDURES
[address] [facility]  174 [facility], [address] [place] [address]      Colonoscopy Operative Report    Jennifer Bryan  [id number]  1950      Procedure Type:   Colonoscopy with polypectomy (snare cautery)     Indications:    Personal history of colon polyps (screening only)       Pre-operative Diagnosis: see indication above    Post-operative Diagnosis:  See findings below    :  Carlota Snow MD    Surgical Assistant: Endoscopy Technician-1: Randi Melendez  Endoscopy RN-1: Rosanne Koroma RN    Implants:  None    Referring Provider: Emily Torres MD      Sedation:  MAC anesthesia Propofol      Procedure Details:  After informed consent was obtained with all risks and benefits of procedure explained and preoperative exam completed, the patient was taken to the endoscopy suite and placed in the left lateral decubitus position. Upon sequential sedation as per above, a digital rectal exam was performed demonstrating internal hemorrhoids. The Olympus videocolonoscope  was inserted in the rectum and carefully advanced to the cecum, which was identified by the ileocecal valve and appendiceal orifice. The cecum was identified by the ileocecal valve and appendiceal orifice. The quality of preparation was good. The colonoscope was slowly withdrawn with careful evaluation between folds. Retroflexion in the rectum was completed . Findings:   Rectum: normal  Sigmoid: mild diverticulosis  Descending Colon: normal  Transverse Colon: 9 mm sessile polyp in proximal transverse colon, removed by hot biopsy  2 cm sessile polyp, adjacent to other polyp, removed in one piece by hot snaring    Ascending Colon: normal  Cecum: normal    Specimen Removed:  as above    Complications: None. EBL:  None. Impression:    see findings    Recommendations: --Await pathology.       Recommendation for next colonscopy in 3 years  High fiber diet  Resume aspirin on 9/1/21    Signed By: John Sahni MD Erma     8/30/2021  9:33 AM

## 2021-08-30 NOTE — PROGRESS NOTES

## 2021-08-30 NOTE — H&P
[address], [address] [place] [address]      History and Physical       NAME:  Radha Gay. :   1950   MRN:   [id number]             History of Present Illness:  Patient is a 80 y.o. who is seen for h/o colon polyps. PMH:  Past Medical History:   Diagnosis Date    Burning with urination     COVID-19 2020    Diabetes mellitus type 2 with complications, uncontrolled ([address]     Glaucoma     Hyperlipidemia, mixed     Hypertension     Hypothyroidism     Long term (current) use of anticoagulants        PSH:  Past Surgical History:   Procedure Laterality Date    COLONOSCOPY N/A 2016    COLONOSCOPY performed by Tiburcio Montano MD at [address] HX CHOLECYSTECTOMY      HX ORTHOPAEDIC      right foot surgery - bone spur       Allergies: Allergies   Allergen Reactions    Aspirin Nausea Only    Nsaids (Non-Steroidal Anti-Inflammatory Drug) Nausea Only    Oxycodone Itching       Home Medications:  Prior to Admission Medications   Prescriptions Last Dose Informant Patient Reported? Taking? SITagliptin-metFORMIN (Janumet XR) 100-1,000 mg  at Unknown time  No Yes   Sig: Take 1 Tab by mouth daily. Stop Januvia and Metformin   aspirin 81 mg chewable tablet 2021 at Unknown time  Yes Yes   Sig: Take 81 mg by mouth daily. atorvastatin (LIPITOR) 40 mg tablet 2021 at Unknown time  Yes Yes   Sig: Take 40 mg by mouth daily. brimonidine-timoloL (Combigan) 0.2-0.5 % drop ophthalmic solution 2021 at Unknown time  Yes Yes   Sig: Administer 1 Drop to both eyes every twelve (12) hours. brinzolamide (Azopt) 1 % ophthalmic suspension 2021 at Unknown time  Yes Yes   Sig: Administer 1 Drop to both eyes two (2) times a day.    glucose blood VI test strips (OneTouch Verio test strips) strip 2021 at Unknown time  No Yes   Sig: Test once daily Dx Code: E11.65   lancets (OneTouch Delica Plus Lancet) 33 gauge misc 2021 at Unknown time  No Yes   Sig: Test once daily Dx Code: E11.65   levothyroxine (SYNTHROID) 150 mcg tablet 8/29/2021 at Unknown time  Yes Yes   Sig: Take 112 mcg by mouth Daily (before breakfast). 0.15 mg    lisinopril-hydrochlorothiazide (PRINZIDE, ZESTORETIC) 20-25 mg per tablet 8/29/2021 at Unknown time  Yes Yes   Sig: Take 1 Tab by mouth daily. netarsudiL-latanoprost (Rocklatan) 0.02-0.005 % drop 8/29/2021 at Unknown time  Yes Yes   Sig: Apply  to eye daily. Facility-Administered Medications: None       Hospital Medications:  Current Facility-Administered Medications   Medication Dose Route Frequency    0.9% sodium chloride infusion  50 mL/hr IntraVENous CONTINUOUS    sodium chloride (NS) flush 5-40 mL  5-40 mL IntraVENous Q8H    sodium chloride (NS) flush 5-40 mL  5-40 mL IntraVENous PRN    midazolam (VERSED) injection 0.25-5 mg  0.25-5 mg IntraVENous Multiple    fentaNYL citrate (PF) injection  mcg   mcg IntraVENous Multiple    naloxone (NARCAN) injection 0.4 mg  0.4 mg IntraVENous Multiple    flumazeniL (ROMAZICON) 0.1 mg/mL injection 0.2 mg  0.2 mg IntraVENous Multiple    simethicone (MYLICON) 00VX/3.1ZT oral drops 80 mg  1.2 mL Oral Multiple    atropine injection 0.5 mg  0.5 mg IntraVENous ONCE PRN    EPINEPHrine (ADRENALIN) 0.1 mg/mL syringe 1 mg  1 mg Endoscopically ONCE PRN       Social History:  Social History     Tobacco Use    Smoking status: Former Smoker    Smokeless tobacco: Never Used    Tobacco comment: quit  age 30 yrs   Substance Use Topics    Alcohol use:  Yes     Alcohol/week: 1.7 standard drinks     Types: 2 Cans of beer per week     Comment: 0-2 per week       Family History:  Family History   Problem Relation Age of Onset    Heart Disease Mother     Stroke Father     Diabetes Sister          The patient was counseled at length about the risks of cary Covid-19 in the buck-operative and post-operative states including the recovery window of their procedure. The patient was made aware that cary Covid-19 after a surgical procedure may worsen their prognosis for recovering from the virus and lend to a higher morbidity and or mortality risk. The patient was given the options of postponing their procedure. All of the risks, benefits, and alternatives were discussed. The patient does  wish to proceed with the procedure. Review of Systems:      Constitutional: negative fever, negative chills, negative weight loss  Eyes:   negative visual changes  ENT:   negative sore throat, tongue or lip swelling  Respiratory:  negative cough, negative dyspnea  Cards:  negative for chest pain, palpitations, lower extremity edema  GI:   See HPI  :  negative for frequency, dysuria  Integument:  negative for rash and pruritus  Heme:  negative for easy bruising and gum/nose bleeding  Musculoskel: negative for myalgias,  back pain and muscle weakness  Neuro: negative for headaches, dizziness, vertigo  Psych:  negative for feelings of anxiety, depression       Objective:     Patient Vitals for the past 8 hrs:   BP Pulse Resp Height Weight   08/30/21 0817 117/76 77 16 5' 8\" (1.727 m) 94.3 kg (208 lb)     No intake/output data recorded. No intake/output data recorded. EXAM:     NEURO-a&o   HEENT-wnl   LUNGS-clear    COR-regular rate and rhythym     ABD-soft , no tenderness, no rebound, good bs     EXT-no edema     Data Review     No results for input(s): WBC, HGB, HCT, PLT, HGBEXT, HCTEXT, PLTEXT in the last 72 hours. No results for input(s): NA, K, CL, CO2, BUN, CREA, GLU, PHOS, CA in the last 72 hours. No results for input(s): AP, TBIL, TP, ALB, GLOB, GGT, AML, LPSE in the last 72 hours. No lab exists for component: SGOT, GPT, AMYP, HLPSE  No results for input(s): INR, PTP, APTT, INREXT in the last 72 hours.        Assessment:     · H/o colon polyps     Patient Active Problem List   Diagnosis Code    Type 2 diabetes mellitus with hyperglycemia, without long-term current use of insulin ([facility] E11.65    Essential hypertension I10    Mixed hyperlipidemia E78.2         Plan:   ·   · Endoscopic procedure with sedation     Signed By: Samuel Álvarez MD     8/30/2021  8:45 AM

## 2021-08-30 NOTE — ANESTHESIA POSTPROCEDURE EVALUATION
Procedure(s):  COLONOSCOPY   :-  ENDOSCOPIC POLYPECTOMY  RESOLUTION CLIP. MAC    Anesthesia Post Evaluation        Patient location during evaluation: PACU  Patient participation: complete - patient participated  Level of consciousness: awake and alert  Pain management: adequate  Airway patency: patent  Anesthetic complications: no  Cardiovascular status: acceptable  Respiratory status: acceptable  Hydration status: acceptable  Comments: I have seen and evaluated the patient and is ready for discharge. Amish Luevano MD    Post anesthesia nausea and vomiting:  none      INITIAL Post-op Vital signs: No vitals data found for the desired time range.

## 2021-08-30 NOTE — ANESTHESIA PREPROCEDURE EVALUATION
Anesthetic History   No history of anesthetic complications            Review of Systems / Medical History  Patient summary reviewed, nursing notes reviewed and pertinent labs reviewed    Pulmonary  Within defined limits                 Neuro/Psych   Within defined limits           Cardiovascular    Hypertension                   GI/Hepatic/Renal  Within defined limits              Endo/Other    Diabetes  Hypothyroidism  Obesity     Other Findings              Physical Exam    Airway  Mallampati: II  TM Distance: > 6 cm  Neck ROM: normal range of motion   Mouth opening: Normal     Cardiovascular  Regular rate and rhythm,  S1 and S2 normal,  no murmur, click, rub, or gallop             Dental  No notable dental hx       Pulmonary  Breath sounds clear to auscultation               Abdominal  GI exam deferred       Other Findings            Anesthetic Plan    ASA: 2  Anesthesia type: MAC          Induction: Intravenous  Anesthetic plan and risks discussed with: Patient

## 2021-09-08 ENCOUNTER — TELEPHONE (OUTPATIENT)
Dept: ENDOCRINOLOGY | Age: 71
End: 2021-09-08

## 2021-09-08 NOTE — TELEPHONE ENCOUNTER
----- Message from Paulina David sent at 9/8/2021  9:49 AM EDT -----  Regarding: Dr. Nell Hayes telephone  General Message/Vendor Calls      Caller's first and last name: Miroslava Mack ([facility])      Reason for call: RX Lidocaine 5% ointment qty 150gm      Callback required yes/no and why: Yes, would like to know if fax was received. Best contact number(s): [phone]      Details to clarify the request: Miroslava Mack (Patient Coordinator of Pt/ Memo Banerjee) would like to know if we have received fax sent today. Pt needs prescription Lidocaine 5% ointment qty 150 gm.       Paulina David

## 2021-10-01 LAB
ALBUMIN/CREAT UR: 10 MG/G CREAT (ref 0–29)
BUN SERPL-MCNC: 11 MG/DL (ref 8–27)
BUN/CREAT SERPL: 12 (ref 10–24)
CALCIUM SERPL-MCNC: 9.3 MG/DL (ref 8.6–10.2)
CHLORIDE SERPL-SCNC: 104 MMOL/L (ref 96–106)
CO2 SERPL-SCNC: 27 MMOL/L (ref 20–29)
CREAT SERPL-MCNC: 0.93 MG/DL (ref 0.76–1.27)
CREAT UR-MCNC: 97.8 MG/DL
EST. AVERAGE GLUCOSE BLD GHB EST-MCNC: 166 MG/DL
GLUCOSE SERPL-MCNC: 187 MG/DL (ref 65–99)
HBA1C MFR BLD: 7.4 % (ref 4.8–5.6)
LDLC SERPL DIRECT ASSAY-MCNC: 79 MG/DL (ref 0–99)
MICROALBUMIN UR-MCNC: 9.7 UG/ML
POTASSIUM SERPL-SCNC: 4 MMOL/L (ref 3.5–5.2)
SODIUM SERPL-SCNC: 142 MMOL/L (ref 134–144)

## 2021-10-07 ENCOUNTER — OFFICE VISIT (OUTPATIENT)
Dept: ENDOCRINOLOGY | Age: 71
End: 2021-10-07
Payer: MEDICARE

## 2021-10-07 VITALS
OXYGEN SATURATION: 97 % | HEART RATE: 88 BPM | DIASTOLIC BLOOD PRESSURE: 82 MMHG | TEMPERATURE: 97.2 F | WEIGHT: 209 LBS | HEIGHT: 68 IN | SYSTOLIC BLOOD PRESSURE: 131 MMHG | BODY MASS INDEX: 31.67 KG/M2 | RESPIRATION RATE: 16 BRPM

## 2021-10-07 DIAGNOSIS — E03.9 PRIMARY HYPOTHYROIDISM: ICD-10-CM

## 2021-10-07 DIAGNOSIS — E11.65 TYPE 2 DIABETES MELLITUS WITH HYPERGLYCEMIA, WITHOUT LONG-TERM CURRENT USE OF INSULIN (HCC): ICD-10-CM

## 2021-10-07 DIAGNOSIS — E78.2 MIXED HYPERLIPIDEMIA: ICD-10-CM

## 2021-10-07 DIAGNOSIS — E11.65 TYPE 2 DIABETES MELLITUS WITH HYPERGLYCEMIA, UNSPECIFIED WHETHER LONG TERM INSULIN USE (HCC): Primary | ICD-10-CM

## 2021-10-07 PROCEDURE — 3051F HG A1C>EQUAL 7.0%<8.0%: CPT | Performed by: INTERNAL MEDICINE

## 2021-10-07 PROCEDURE — 1101F PT FALLS ASSESS-DOCD LE1/YR: CPT | Performed by: INTERNAL MEDICINE

## 2021-10-07 PROCEDURE — 3017F COLORECTAL CA SCREEN DOC REV: CPT | Performed by: INTERNAL MEDICINE

## 2021-10-07 PROCEDURE — G8432 DEP SCR NOT DOC, RNG: HCPCS | Performed by: INTERNAL MEDICINE

## 2021-10-07 PROCEDURE — G8417 CALC BMI ABV UP PARAM F/U: HCPCS | Performed by: INTERNAL MEDICINE

## 2021-10-07 PROCEDURE — G8754 DIAS BP LESS 90: HCPCS | Performed by: INTERNAL MEDICINE

## 2021-10-07 PROCEDURE — G8427 DOCREV CUR MEDS BY ELIG CLIN: HCPCS | Performed by: INTERNAL MEDICINE

## 2021-10-07 PROCEDURE — G8536 NO DOC ELDER MAL SCRN: HCPCS | Performed by: INTERNAL MEDICINE

## 2021-10-07 PROCEDURE — 99214 OFFICE O/P EST MOD 30 MIN: CPT | Performed by: INTERNAL MEDICINE

## 2021-10-07 PROCEDURE — G8752 SYS BP LESS 140: HCPCS | Performed by: INTERNAL MEDICINE

## 2021-10-07 PROCEDURE — 2022F DILAT RTA XM EVC RTNOPTHY: CPT | Performed by: INTERNAL MEDICINE

## 2021-10-07 RX ORDER — SITAGLIPTIN AND METFORMIN HYDROCHLORIDE 100; 1000 MG/1; MG/1
1 TABLET, FILM COATED, EXTENDED RELEASE ORAL DAILY
Qty: 90 TABLET | Refills: 3 | Status: SHIPPED | OUTPATIENT
Start: 2021-10-07 | End: 2022-03-11 | Stop reason: SDUPTHER

## 2021-10-07 RX ORDER — ASPIRIN 81 MG/1
81 TABLET ORAL DAILY
COMMUNITY

## 2021-10-07 NOTE — PROGRESS NOTES
Libbie Severs. is a 80 y.o. male here for   Chief Complaint   Patient presents with    Diabetes       1. Have you been to the ER, urgent care clinic since your last visit? Hospitalized since your last visit? -no    2. Have you seen or consulted any other health care providers outside of the [address] [place] [address] since your last visit? Include any pap smears or colon screening. -GI and eye doc

## 2021-10-07 NOTE — PROGRESS NOTES
Rhea Sanabria MD          Patient Information  Date:10/7/2021  Name : Lavonne Kim 80 y.o.     YOB: 1950         Referred by: Luann Blancas MD         Chief Complaint   Patient presents with    Diabetes       History of Present Illness: Lavonne Kim is a 80 y.o. male here for follow-up of  Type 2 Diabetes Mellitus. Type 2 Diabetes was diagnosed 2015. End organ effects of diabetes: none. Cardiovascular risk factors: dyslipidemia, diabetes mellitus   No exercise     He is on metformin and Januvia  Weight has been stable  He has quit sodas    No chest pain, shortness of breath, blurred vision      Hypothyroidism: On levothyroxine    Wt Readings from Last 3 Encounters:   10/07/21 209 lb (94.8 kg)   08/30/21 208 lb (94.3 kg)   06/02/21 212 lb (96.2 kg)       BP Readings from Last 3 Encounters:   10/07/21 131/82   08/30/21 105/74   06/02/21 (!) 151/93           Past Medical History:   Diagnosis Date    Burning with urination     COVID-19 11/2020    Diabetes mellitus type 2 with complications, uncontrolled ([facility]     Glaucoma     Hyperlipidemia, mixed     Hypertension     Hypothyroidism     Long term (current) use of anticoagulants      Current Outpatient Medications   Medication Sig    aspirin delayed-release 81 mg tablet Take 81 mg by mouth daily.  SITagliptin-metFORMIN (Janumet XR) 100-1,000 mg TM24 Take 1 Tablet by mouth daily. Stop Januvia and Metformin    brinzolamide (Azopt) 1 % ophthalmic suspension Administer 1 Drop to both eyes two (2) times a day.  brimonidine-timoloL (Combigan) 0.2-0.5 % drop ophthalmic solution Administer 1 Drop to both eyes every twelve (12) hours.  netarsudiL-latanoprost (Rocklatan) 0.02-0.005 % drop Apply  to eye daily.     glucose blood VI test strips (OneTouch Verio test strips) strip Test once daily Dx Code: E11.65    lancets (OneTouch Delica Plus Lancet) 33 gauge misc Test once daily Dx Code: E11.65    levothyroxine (SYNTHROID) 150 mcg tablet Take 112 mcg by mouth Daily (before breakfast). 0.15 mg     lisinopril-hydrochlorothiazide (PRINZIDE, ZESTORETIC) 20-25 mg per tablet Take 1 Tab by mouth daily.  atorvastatin (LIPITOR) 40 mg tablet Take 40 mg by mouth daily. No current facility-administered medications for this visit. Allergies   Allergen Reactions    Nsaids (Non-Steroidal Anti-Inflammatory Drug) Nausea Only    Oxycodone Itching       Review of Systems:  All 10 systems reviewed and are negative other than mentioned in HPI    Physical Examination:   Blood pressure 131/82, pulse 88, temperature 97.2 °F (36.2 °C), temperature source Temporal, resp. rate 16, height 5' 8\" (1.727 m), weight 209 lb (94.8 kg), SpO2 97 %. Estimated body mass index is 31.78 kg/m² as calculated from the following:    Height as of this encounter: 5' 8\" (1.727 m). -   Weight as of this encounter: 209 lb (94.8 kg).   - General: pleasant, no distress, good eye contact  - HEENT: no exophthalmos, no periorbital edema, EOMI  - Neck: No thyromegaly  - CVS: S1-S2 regular  - RS: Normal respiratory effort  - Musculoskeletal: no tremors  - Neurological: alert and oriented  - Psychiatric: normal mood and affect  - Skin: Normal color    October 2021  Diabetic foot exam:     Left:     Vibratory sensation normal    Filament test normal sensation with micro filament   Pulse DP: 1+    Deformities: None  Right:    Vibratory sensation normal   Filament test normal sensation with micro filament   Pulse DP: 1+   Deformities: None    Data Reviewed:       Lab Results   Component Value Date/Time    Hemoglobin A1c 7.4 (H) 09/30/2021 12:00 AM    Hemoglobin A1c, External 7.2 03/24/2021 12:00 AM    Hemoglobin A1c, External 6.8 10/28/2020 12:00 AM    Glucose 187 (H) 09/30/2021 12:00 AM    Glucose (POC) 137 (H) 08/30/2021 08:44 AM    Microalb/Creat ratio (ug/mg creat.) 10 09/30/2021 12:00 AM    LDL,Direct 79 09/30/2021 12:00 AM    Creatinine 0.93 09/30/2021 12:00 AM      Lab Results   Component Value Date/Time    GFR est non-AA 83 09/30/2021 12:00 AM    GFR est AA 96 09/30/2021 12:00 AM    Creatinine 0.93 09/30/2021 12:00 AM    BUN 11 09/30/2021 12:00 AM    Sodium 142 09/30/2021 12:00 AM    Potassium 4.0 09/30/2021 12:00 AM    Chloride 104 09/30/2021 12:00 AM    CO2 27 09/30/2021 12:00 AM       Assessment/Plan:     1. Type 2 diabetes mellitus with hyperglycemia, unspecified whether long term insulin use ([facility]    2. Mixed hyperlipidemia    3. Primary hypothyroidism    4. Type 2 diabetes mellitus with hyperglycemia, without long-term current use of insulin ([facility])        1. Type 2 Diabetes Mellitus    Lab Results   Component Value Date/Time    Hemoglobin A1c 7.4 (H) 09/30/2021 12:00 AM    Hemoglobin A1c, External 7.2 03/24/2021 12:00 AM     Janumet extended release  Will resume exercise     2. HTN : Continue current therapy     3. Hyperlipidemia : Continue statin. 4.Obesity:Body mass index is 31.78 kg/m². Discussed about the importance of exercise and carbohydrate portion control. 5.  Hypothyroidism      There are no Patient Instructions on file for this visit. Follow-up and Dispositions    · Return in about 4 months (around 2/7/2022) for labs before next visit and follow up. Thank you for allowing me to participate in the care of this patient. Ivory Koenig MD      Patient verbalized understanding     Voice-recognition software was used to generate this report, which may result in some phonetic-based errors in the grammar and contents. Even though attempts were made to correct all the mistakes, some may have been missed and remained in the body of the report.

## 2021-10-07 NOTE — LETTER
10/7/2021    Patient: Shirlene Waite. YOB: 1950   Date of Visit: 10/7/2021     Norma Gooden MD  [address] [place] [address]  Via Fax: [phone]    Dear Norma Gooden MD,      Thank you for referring Mr. Emma Hanley to [address] [place] [address] for evaluation. My notes for this consultation are attached. If you have questions, please do not hesitate to call me. I look forward to following your patient along with you.       Sincerely,    Yanely Tapia MD

## 2022-01-31 ENCOUNTER — TELEPHONE (OUTPATIENT)
Dept: ENDOCRINOLOGY | Age: 72
End: 2022-01-31

## 2022-01-31 DIAGNOSIS — E78.2 MIXED HYPERLIPIDEMIA: ICD-10-CM

## 2022-01-31 DIAGNOSIS — E11.65 TYPE 2 DIABETES MELLITUS WITH HYPERGLYCEMIA, UNSPECIFIED WHETHER LONG TERM INSULIN USE (HCC): Primary | ICD-10-CM

## 2022-02-01 LAB
ALBUMIN/CREAT UR: <6 MG/G CREAT (ref 0–29)
BUN SERPL-MCNC: 13 MG/DL (ref 8–27)
BUN/CREAT SERPL: 13 (ref 10–24)
CALCIUM SERPL-MCNC: 9.7 MG/DL (ref 8.6–10.2)
CHLORIDE SERPL-SCNC: 106 MMOL/L (ref 96–106)
CO2 SERPL-SCNC: 26 MMOL/L (ref 20–29)
CREAT SERPL-MCNC: 1.01 MG/DL (ref 0.76–1.27)
CREAT UR-MCNC: 50.1 MG/DL
EST. AVERAGE GLUCOSE BLD GHB EST-MCNC: 166 MG/DL
GLUCOSE SERPL-MCNC: 191 MG/DL (ref 65–99)
HBA1C MFR BLD: 7.4 % (ref 4.8–5.6)
LDLC SERPL DIRECT ASSAY-MCNC: 83 MG/DL (ref 0–99)
MICROALBUMIN UR-MCNC: <3 UG/ML
POTASSIUM SERPL-SCNC: 4.2 MMOL/L (ref 3.5–5.2)
SODIUM SERPL-SCNC: 145 MMOL/L (ref 134–144)

## 2022-03-11 ENCOUNTER — OFFICE VISIT (OUTPATIENT)
Dept: ENDOCRINOLOGY | Age: 72
End: 2022-03-11
Payer: MEDICARE

## 2022-03-11 VITALS
OXYGEN SATURATION: 97 % | HEIGHT: 68 IN | BODY MASS INDEX: 31.83 KG/M2 | DIASTOLIC BLOOD PRESSURE: 88 MMHG | TEMPERATURE: 98 F | WEIGHT: 210 LBS | HEART RATE: 90 BPM | SYSTOLIC BLOOD PRESSURE: 132 MMHG | RESPIRATION RATE: 18 BRPM

## 2022-03-11 DIAGNOSIS — E11.65 TYPE 2 DIABETES MELLITUS WITH HYPERGLYCEMIA, WITHOUT LONG-TERM CURRENT USE OF INSULIN (HCC): Primary | ICD-10-CM

## 2022-03-11 DIAGNOSIS — I10 ESSENTIAL HYPERTENSION: ICD-10-CM

## 2022-03-11 DIAGNOSIS — E78.2 MIXED HYPERLIPIDEMIA: ICD-10-CM

## 2022-03-11 PROCEDURE — 2022F DILAT RTA XM EVC RTNOPTHY: CPT | Performed by: INTERNAL MEDICINE

## 2022-03-11 PROCEDURE — G8536 NO DOC ELDER MAL SCRN: HCPCS | Performed by: INTERNAL MEDICINE

## 2022-03-11 PROCEDURE — 1101F PT FALLS ASSESS-DOCD LE1/YR: CPT | Performed by: INTERNAL MEDICINE

## 2022-03-11 PROCEDURE — G8754 DIAS BP LESS 90: HCPCS | Performed by: INTERNAL MEDICINE

## 2022-03-11 PROCEDURE — G8432 DEP SCR NOT DOC, RNG: HCPCS | Performed by: INTERNAL MEDICINE

## 2022-03-11 PROCEDURE — G8417 CALC BMI ABV UP PARAM F/U: HCPCS | Performed by: INTERNAL MEDICINE

## 2022-03-11 PROCEDURE — 3051F HG A1C>EQUAL 7.0%<8.0%: CPT | Performed by: INTERNAL MEDICINE

## 2022-03-11 PROCEDURE — G8427 DOCREV CUR MEDS BY ELIG CLIN: HCPCS | Performed by: INTERNAL MEDICINE

## 2022-03-11 PROCEDURE — 3017F COLORECTAL CA SCREEN DOC REV: CPT | Performed by: INTERNAL MEDICINE

## 2022-03-11 PROCEDURE — 99214 OFFICE O/P EST MOD 30 MIN: CPT | Performed by: INTERNAL MEDICINE

## 2022-03-11 PROCEDURE — G8752 SYS BP LESS 140: HCPCS | Performed by: INTERNAL MEDICINE

## 2022-03-11 RX ORDER — SITAGLIPTIN AND METFORMIN HYDROCHLORIDE 100; 1000 MG/1; MG/1
1 TABLET, FILM COATED, EXTENDED RELEASE ORAL DAILY
Qty: 90 TABLET | Refills: 3 | Status: SHIPPED | OUTPATIENT
Start: 2022-03-11 | End: 2022-08-11 | Stop reason: SDUPTHER

## 2022-03-11 RX ORDER — PREDNISOLONE ACETATE 10 MG/ML
1 SUSPENSION/ DROPS OPHTHALMIC 4 TIMES DAILY
COMMUNITY
Start: 2022-03-07

## 2022-03-11 NOTE — PROGRESS NOTES
Majo Peng. is a 71 y.o. male here for   Chief Complaint   Patient presents with    Diabetes       1. Have you been to the ER, urgent care clinic since your last visit? Hospitalized since your last visit? -no    2. Have you seen or consulted any other health care providers outside of the [address] [place] [address] since your last visit?   Include any pap smears or colon screening.-eye doc

## 2022-03-11 NOTE — PROGRESS NOTES
Order placed for pt,  per Verbal Order with read back from Dr Debbie Barnard 3/11/2022 Statement Selected

## 2022-03-11 NOTE — LETTER
3/12/2022    Patient: Majo Peng. YOB: 1950   Date of Visit: 3/11/2022     Sixto Campos MD  [address] [place] [address]  Via Fax: [phone]    Dear Sixto Campos MD,      Thank you for referring Mr. Leona Crouch to [address] [place] [address] for evaluation. My notes for this consultation are attached. If you have questions, please do not hesitate to call me. I look forward to following your patient along with you.       Sincerely,    Fransisco Bettencourt MD

## 2022-03-12 NOTE — PROGRESS NOTES
Lex Gomez MD          Patient Information  Date:3/12/2022  Name : Lew Dai. 71 y.o.     YOB: 1950         Referred by: Rafy Raymundo MD         Chief Complaint   Patient presents with    Diabetes       History of Present Illness: Lew Urias is a 71 y.o. male here for follow-up of  Type 2 Diabetes Mellitus. Type 2 Diabetes was diagnosed 2015. End organ effects of diabetes: none. Cardiovascular risk factors: dyslipidemia, diabetes mellitus   No exercise, planning to resume    He is on metformin and Januvia  Weight has been stable  He has quit sodas    No chest pain, shortness of breath, blurred vision      Hypothyroidism: On levothyroxine    Wt Readings from Last 3 Encounters:   03/11/22 210 lb (95.3 kg)   10/07/21 209 lb (94.8 kg)   08/30/21 208 lb (94.3 kg)       BP Readings from Last 3 Encounters:   03/11/22 132/88   10/07/21 131/82   08/30/21 105/74           Past Medical History:   Diagnosis Date    Burning with urination     COVID-19 11/2020    Diabetes mellitus type 2 with complications, uncontrolled ([facility]     Glaucoma     Hyperlipidemia, mixed     Hypertension     Hypothyroidism     Long term (current) use of anticoagulants      Current Outpatient Medications   Medication Sig    prednisoLONE acetate (PRED FORTE) 1 % ophthalmic suspension Administer 1 Drop to both eyes four (4) times daily.  SITagliptin-metFORMIN (Janumet XR) 100-1,000 mg TM24 Take 1 Tablet by mouth daily. Stop Januvia and Metformin    aspirin delayed-release 81 mg tablet Take 81 mg by mouth daily.  glucose blood VI test strips (OneTouch Verio test strips) strip Test once daily Dx Code: E11.65    lancets (OneTouch Delica Plus Lancet) 33 gauge misc Test once daily Dx Code: E11.65    levothyroxine (SYNTHROID) 150 mcg tablet Take 112 mcg by mouth Daily (before breakfast).  0.15 mg     lisinopril-hydrochlorothiazide (PRINZIDE, ZESTORETIC) 20-25 mg per tablet Take 1 Tab by mouth daily.  atorvastatin (LIPITOR) 40 mg tablet Take 40 mg by mouth daily. No current facility-administered medications for this visit. Allergies   Allergen Reactions    Nsaids (Non-Steroidal Anti-Inflammatory Drug) Nausea Only    Oxycodone Itching       Review of Systems: Per HPI    Physical Examination:   Blood pressure 132/88, pulse 90, temperature 98 °F (36.7 °C), temperature source Temporal, resp. rate 18, height 5' 8\" (1.727 m), weight 210 lb (95.3 kg), SpO2 97 %. Estimated body mass index is 31.93 kg/m² as calculated from the following:    Height as of this encounter: 5' 8\" (1.727 m). -   Weight as of this encounter: 210 lb (95.3 kg).   - General: pleasant, no distress, good eye contact  - HEENT: no exophthalmos, no periorbital edema, EOMI  - Neck: No thyromegaly  - CVS: S1-S2 regular  - RS: Normal respiratory effort  - Musculoskeletal: no tremors  - Neurological: alert and oriented  - Psychiatric: normal mood and affect  - Skin: Normal color    October 2021  Diabetic foot exam:     Left:     Vibratory sensation normal    Filament test normal sensation with micro filament   Pulse DP: 1+    Deformities: None  Right:    Vibratory sensation normal   Filament test normal sensation with micro filament   Pulse DP: 1+   Deformities: None    Data Reviewed:       Lab Results   Component Value Date/Time    Hemoglobin A1c 7.4 (H) 01/31/2022 12:00 AM    Hemoglobin A1c 7.4 (H) 09/30/2021 12:00 AM    Hemoglobin A1c, External 7.2 03/24/2021 12:00 AM    Hemoglobin A1c, External 6.8 10/28/2020 12:00 AM    Glucose 191 (H) 01/31/2022 12:00 AM    Glucose (POC) 137 (H) 08/30/2021 08:44 AM    Microalb/Creat ratio (ug/mg creat.) <6 01/31/2022 12:00 AM    LDL,Direct 83 01/31/2022 12:00 AM    Creatinine 1.01 01/31/2022 12:00 AM      Lab Results   Component Value Date/Time    GFR est non-AA 75 01/31/2022 12:00 AM    GFR est AA 87 01/31/2022 12:00 AM    Creatinine 1.01 01/31/2022 12:00 AM    BUN 13 01/31/2022 12:00 AM    Sodium 145 (H) 01/31/2022 12:00 AM    Potassium 4.2 01/31/2022 12:00 AM    Chloride 106 01/31/2022 12:00 AM    CO2 26 01/31/2022 12:00 AM       Assessment/Plan:     1. Type 2 diabetes mellitus with hyperglycemia, without long-term current use of insulin ([address]    2. Mixed hyperlipidemia    3. Essential hypertension        1. Type 2 Diabetes Mellitus    Lab Results   Component Value Date/Time    Hemoglobin A1c 7.4 (H) 01/31/2022 12:00 AM    Hemoglobin A1c, External 7.2 03/24/2021 12:00 AM   Controlled  Janumet extended release  Will resume exercise     2. HTN : Continue current therapy     3. Hyperlipidemia : Continue statin. 4.Obesity:Body mass index is 31.93 kg/m². Discussed about the importance of exercise and carbohydrate portion control. 5.  Hypothyroidism      There are no Patient Instructions on file for this visit. Follow-up and Dispositions    · Return in about 5 months (around 8/11/2022) for labs before next visit and follow up. Thank you for allowing me to participate in the care of this patient. Sylvia Wilson MD      Patient verbalized understanding     Voice-recognition software was used to generate this report, which may result in some phonetic-based errors in the grammar and contents. Even though attempts were made to correct all the mistakes, some may have been missed and remained in the body of the report.

## 2022-03-18 PROBLEM — E11.65 TYPE 2 DIABETES MELLITUS WITH HYPERGLYCEMIA, WITHOUT LONG-TERM CURRENT USE OF INSULIN (HCC): Status: ACTIVE | Noted: 2020-12-11

## 2022-03-19 PROBLEM — I10 ESSENTIAL HYPERTENSION: Status: ACTIVE | Noted: 2020-12-11

## 2022-03-19 PROBLEM — E78.2 MIXED HYPERLIPIDEMIA: Status: ACTIVE | Noted: 2020-12-11

## 2022-07-07 ENCOUNTER — TELEPHONE (OUTPATIENT)
Dept: ENDOCRINOLOGY | Age: 72
End: 2022-07-07

## 2022-07-07 DIAGNOSIS — E11.65 TYPE 2 DIABETES MELLITUS WITH HYPERGLYCEMIA, WITHOUT LONG-TERM CURRENT USE OF INSULIN (HCC): ICD-10-CM

## 2022-07-07 RX ORDER — BLOOD SUGAR DIAGNOSTIC
STRIP MISCELLANEOUS
Qty: 100 STRIP | Refills: 3 | Status: SHIPPED | OUTPATIENT
Start: 2022-07-07

## 2022-07-07 NOTE — TELEPHONE ENCOUNTER
Requested Prescriptions     Signed Prescriptions Disp Refills    glucose blood VI test strips (OneTouch Verio test strips) strip 100 Strip 3     Sig: Test once daily Dx Code: E11.65     Authorizing Provider: Maddie Erazo     Ordering User: Jet Cannon     Verbal order read back to Dr Saintclair Griffith to send refill.

## 2022-08-05 LAB
BUN SERPL-MCNC: 14 MG/DL (ref 8–27)
BUN/CREAT SERPL: 14 (ref 10–24)
CALCIUM SERPL-MCNC: 9.7 MG/DL (ref 8.6–10.2)
CHLORIDE SERPL-SCNC: 101 MMOL/L (ref 96–106)
CO2 SERPL-SCNC: 28 MMOL/L (ref 20–29)
CREAT SERPL-MCNC: 0.97 MG/DL (ref 0.76–1.27)
EGFR: 83 ML/MIN/1.73
EST. AVERAGE GLUCOSE BLD GHB EST-MCNC: 128 MG/DL
GLUCOSE SERPL-MCNC: 99 MG/DL (ref 65–99)
HBA1C MFR BLD: 6.1 % (ref 4.8–5.6)
POTASSIUM SERPL-SCNC: 3.8 MMOL/L (ref 3.5–5.2)
SODIUM SERPL-SCNC: 141 MMOL/L (ref 134–144)

## 2022-08-11 ENCOUNTER — OFFICE VISIT (OUTPATIENT)
Dept: ENDOCRINOLOGY | Age: 72
End: 2022-08-11
Payer: MEDICARE

## 2022-08-11 VITALS
HEIGHT: 68 IN | DIASTOLIC BLOOD PRESSURE: 80 MMHG | BODY MASS INDEX: 28.49 KG/M2 | OXYGEN SATURATION: 97 % | HEART RATE: 90 BPM | WEIGHT: 188 LBS | SYSTOLIC BLOOD PRESSURE: 123 MMHG | RESPIRATION RATE: 20 BRPM | TEMPERATURE: 96.4 F

## 2022-08-11 DIAGNOSIS — I10 ESSENTIAL HYPERTENSION: ICD-10-CM

## 2022-08-11 DIAGNOSIS — E78.2 MIXED HYPERLIPIDEMIA: ICD-10-CM

## 2022-08-11 DIAGNOSIS — E11.65 TYPE 2 DIABETES MELLITUS WITH HYPERGLYCEMIA, WITHOUT LONG-TERM CURRENT USE OF INSULIN (HCC): Primary | ICD-10-CM

## 2022-08-11 PROCEDURE — 2022F DILAT RTA XM EVC RTNOPTHY: CPT | Performed by: INTERNAL MEDICINE

## 2022-08-11 PROCEDURE — 1101F PT FALLS ASSESS-DOCD LE1/YR: CPT | Performed by: INTERNAL MEDICINE

## 2022-08-11 PROCEDURE — G8510 SCR DEP NEG, NO PLAN REQD: HCPCS | Performed by: INTERNAL MEDICINE

## 2022-08-11 PROCEDURE — 3017F COLORECTAL CA SCREEN DOC REV: CPT | Performed by: INTERNAL MEDICINE

## 2022-08-11 PROCEDURE — 3044F HG A1C LEVEL LT 7.0%: CPT | Performed by: INTERNAL MEDICINE

## 2022-08-11 PROCEDURE — G8752 SYS BP LESS 140: HCPCS | Performed by: INTERNAL MEDICINE

## 2022-08-11 PROCEDURE — G8417 CALC BMI ABV UP PARAM F/U: HCPCS | Performed by: INTERNAL MEDICINE

## 2022-08-11 PROCEDURE — G8536 NO DOC ELDER MAL SCRN: HCPCS | Performed by: INTERNAL MEDICINE

## 2022-08-11 PROCEDURE — G8754 DIAS BP LESS 90: HCPCS | Performed by: INTERNAL MEDICINE

## 2022-08-11 PROCEDURE — 99214 OFFICE O/P EST MOD 30 MIN: CPT | Performed by: INTERNAL MEDICINE

## 2022-08-11 PROCEDURE — 1123F ACP DISCUSS/DSCN MKR DOCD: CPT | Performed by: INTERNAL MEDICINE

## 2022-08-11 PROCEDURE — G8427 DOCREV CUR MEDS BY ELIG CLIN: HCPCS | Performed by: INTERNAL MEDICINE

## 2022-08-11 RX ORDER — SITAGLIPTIN AND METFORMIN HYDROCHLORIDE 100; 1000 MG/1; MG/1
1 TABLET, FILM COATED, EXTENDED RELEASE ORAL DAILY
Qty: 90 TABLET | Refills: 3 | Status: SHIPPED | OUTPATIENT
Start: 2022-08-11

## 2022-08-11 NOTE — PROGRESS NOTES
Isauro Parish MD          Patient Information  Date:8/11/2022  Name : Zachary Berry 71 y.o.     YOB: 1950         Referred by: Faby Beck MD         Chief Complaint   Patient presents with    Diabetes     5 months follow up. History of Present Illness: Zachary Berry is a 71 y.o. male here for follow-up of  Type 2 Diabetes Mellitus. Type 2 Diabetes was diagnosed 2015. End organ effects of diabetes: none. Cardiovascular risk factors: dyslipidemia, diabetes mellitus   No severe hypoglycemia  Taking medications consistently  He has lost weight    He has quit sodas    No chest pain, shortness of breath, blurred vision      Hypothyroidism: On levothyroxine    Wt Readings from Last 3 Encounters:   08/11/22 188 lb (85.3 kg)   03/11/22 210 lb (95.3 kg)   10/07/21 209 lb (94.8 kg)       BP Readings from Last 3 Encounters:   08/11/22 123/80   03/11/22 132/88   10/07/21 131/82           Past Medical History:   Diagnosis Date    Burning with urination     COVID-19 11/2020    Diabetes mellitus type 2 with complications, uncontrolled     Glaucoma     Hyperlipidemia, mixed     Hypertension     Hypothyroidism     Long term (current) use of anticoagulants      Current Outpatient Medications   Medication Sig    glucose blood VI test strips (OneTouch Verio test strips) strip Test once daily Dx Code: E11.65    prednisoLONE acetate (PRED FORTE) 1 % ophthalmic suspension Administer 1 Drop to both eyes four (4) times daily. SITagliptin-metFORMIN (Janumet XR) 100-1,000 mg TM24 Take 1 Tablet by mouth daily. Stop Januvia and Metformin    aspirin delayed-release 81 mg tablet Take 81 mg by mouth daily. lancets (OneTouch Delica Plus Lancet) 33 gauge misc Test once daily Dx Code: E11.65    levothyroxine (SYNTHROID) 150 mcg tablet Take 112 mcg by mouth Daily (before breakfast).  0.15 mg     lisinopril-hydrochlorothiazide (PRINZIDE, ZESTORETIC) 20-25 mg per tablet Take 1 Tab by mouth daily. atorvastatin (LIPITOR) 40 mg tablet Take 40 mg by mouth daily. No current facility-administered medications for this visit. Allergies   Allergen Reactions    Nsaids (Non-Steroidal Anti-Inflammatory Drug) Nausea Only    Oxycodone Itching       Review of Systems: Per HPI    Physical Examination:   Blood pressure 123/80, pulse (!) 120, temperature (!) 96.4 °F (35.8 °C), resp. rate 20, height 5' 8\" (1.727 m), weight 188 lb (85.3 kg), SpO2 97 %. Estimated body mass index is 28.59 kg/m² as calculated from the following:    Height as of this encounter: 5' 8\" (1.727 m). Weight as of this encounter: 188 lb (85.3 kg).   General: pleasant, no distress, good eye contact  HEENT: no exophthalmos, no periorbital edema, EOMI  Neck: No thyromegaly  CVS: S1-S2 regular, no tachycardia,  RS: Normal respiratory effort  Musculoskeletal: no tremors  Neurological: alert and oriented  Psychiatric: normal mood and affect  Skin: Normal color    October 2021  Diabetic foot exam:     Left:     Vibratory sensation normal    Filament test normal sensation with micro filament   Pulse DP: 1+    Deformities: None  Right:    Vibratory sensation normal   Filament test normal sensation with micro filament   Pulse DP: 1+   Deformities: None    Data Reviewed:       Lab Results   Component Value Date/Time    Hemoglobin A1c 6.1 (H) 08/04/2022 12:00 AM    Hemoglobin A1c 7.4 (H) 01/31/2022 12:00 AM    Hemoglobin A1c 7.4 (H) 09/30/2021 12:00 AM    Hemoglobin A1c, External 7.7 08/04/2021 12:00 AM    Hemoglobin A1c, External 7.2 03/24/2021 12:00 AM    Hemoglobin A1c, External 6.8 10/28/2020 12:00 AM    Glucose 99 08/04/2022 12:00 AM    Glucose (POC) 137 (H) 08/30/2021 08:44 AM    Microalb/Creat ratio (ug/mg creat.) <6 01/31/2022 12:00 AM    LDL,Direct 83 01/31/2022 12:00 AM    Creatinine 0.97 08/04/2022 12:00 AM      Lab Results   Component Value Date/Time    GFR est non-AA 75 01/31/2022 12:00 AM    GFR est AA 87 01/31/2022 12:00 AM    Creatinine 0.97 08/04/2022 12:00 AM    BUN 14 08/04/2022 12:00 AM    Sodium 141 08/04/2022 12:00 AM    Potassium 3.8 08/04/2022 12:00 AM    Chloride 101 08/04/2022 12:00 AM    CO2 28 08/04/2022 12:00 AM       Assessment/Plan:     1. Type 2 diabetes mellitus with hyperglycemia, without long-term current use of insulin ([address]    2. Essential hypertension    3. Mixed hyperlipidemia        1. Type 2 Diabetes Mellitus    Lab Results   Component Value Date/Time    Hemoglobin A1c 6.1 (H) 08/04/2022 12:00 AM    Hemoglobin A1c, External 7.7 08/04/2021 12:00 AM   Controlled  Janumet extended release  Will resume exercise     2. HTN : Continue current therapy     3. Hyperlipidemia : Continue statin. 4.Obesity:Body mass index is 28.59 kg/m². Discussed about the importance of exercise and carbohydrate portion control. 5.  Hypothyroidism      There are no Patient Instructions on file for this visit. Thank you for allowing me to participate in the care of this patient. Aurora Sarabia MD      Patient verbalized understanding     Voice-recognition software was used to generate this report, which may result in some phonetic-based errors in the grammar and contents. Even though attempts were made to correct all the mistakes, some may have been missed and remained in the body of the report.

## 2022-08-11 NOTE — LETTER
8/11/2022    Patient: Cierra Esqueda. YOB: 1950   Date of Visit: 8/11/2022     Garrett Hall MD  [facility] [place] [id number]  Via Fax: [phone]    Dear Garrett Hall MD,      Thank you for referring Mr. Chanda Prado to [address] [place] [address] for evaluation. My notes for this consultation are attached. If you have questions, please do not hesitate to call me. I look forward to following your patient along with you.       Sincerely,    Tomasa Contrreas MD

## 2022-08-11 NOTE — PROGRESS NOTES
Chief Complaint   Patient presents with    Diabetes     5 months follow up.         Visit Vitals  /80   Pulse (!) 120   Temp (!) 96.4 °F (35.8 °C)   Resp 20   Ht 5' 8\" (1.727 m)   Wt 188 lb (85.3 kg)   SpO2 97%   BMI 28.59 kg/m²

## 2023-02-06 DIAGNOSIS — E11.65 TYPE 2 DIABETES MELLITUS WITH HYPERGLYCEMIA, WITHOUT LONG-TERM CURRENT USE OF INSULIN (HCC): ICD-10-CM

## 2023-02-06 RX ORDER — SITAGLIPTIN AND METFORMIN HYDROCHLORIDE 100; 1000 MG/1; MG/1
1 TABLET, FILM COATED, EXTENDED RELEASE ORAL DAILY
Qty: 90 TABLET | Refills: 3 | Status: SHIPPED | OUTPATIENT
Start: 2023-02-06

## 2023-02-06 NOTE — TELEPHONE ENCOUNTER
Rx request for janumet sent to local pharmacy as requested.      Rx routed to Dr. Negrita Ortiz for approval.

## 2023-07-21 LAB — HBA1C MFR BLD HPLC: 6.2 %

## 2023-09-11 ENCOUNTER — OFFICE VISIT (OUTPATIENT)
Age: 73
End: 2023-09-11
Payer: MEDICARE

## 2023-09-11 VITALS
DIASTOLIC BLOOD PRESSURE: 86 MMHG | HEART RATE: 110 BPM | RESPIRATION RATE: 18 BRPM | OXYGEN SATURATION: 95 % | BODY MASS INDEX: 29.45 KG/M2 | HEIGHT: 68 IN | WEIGHT: 194.3 LBS | TEMPERATURE: 97.5 F | SYSTOLIC BLOOD PRESSURE: 132 MMHG

## 2023-09-11 DIAGNOSIS — E78.2 MIXED HYPERLIPIDEMIA: ICD-10-CM

## 2023-09-11 DIAGNOSIS — E11.65 TYPE 2 DIABETES MELLITUS WITH HYPERGLYCEMIA, UNSPECIFIED WHETHER LONG TERM INSULIN USE (HCC): Primary | ICD-10-CM

## 2023-09-11 DIAGNOSIS — I10 ESSENTIAL HYPERTENSION: ICD-10-CM

## 2023-09-11 PROCEDURE — 99214 OFFICE O/P EST MOD 30 MIN: CPT | Performed by: INTERNAL MEDICINE

## 2023-09-11 PROCEDURE — 3075F SYST BP GE 130 - 139MM HG: CPT | Performed by: INTERNAL MEDICINE

## 2023-09-11 PROCEDURE — 3079F DIAST BP 80-89 MM HG: CPT | Performed by: INTERNAL MEDICINE

## 2023-09-11 PROCEDURE — 1123F ACP DISCUSS/DSCN MKR DOCD: CPT | Performed by: INTERNAL MEDICINE

## 2023-09-11 RX ORDER — METOPROLOL SUCCINATE 25 MG/1
TABLET, EXTENDED RELEASE ORAL
COMMUNITY
Start: 2023-08-29

## 2023-09-11 RX ORDER — BLOOD SUGAR DIAGNOSTIC
STRIP MISCELLANEOUS
COMMUNITY
Start: 2023-04-10

## 2023-09-11 RX ORDER — LANCETS 33 GAUGE
EACH MISCELLANEOUS
COMMUNITY
Start: 2023-04-10

## 2023-09-11 NOTE — PROGRESS NOTES
Jasmyn Gomez. is a 68 y.o. male here for   Chief Complaint   Patient presents with    Diabetes    Thyroid Problem       1. Have you been to the ER, urgent care clinic since your last visit? Hospitalized since your last visit? - no    2. Have you seen or consulted any other health care providers outside of the [address] [place] [address] Avenue since your last visit?   Include any pap smears or colon screening.- Ophthalmology, PCP

## 2023-09-11 NOTE — PROGRESS NOTES
Fang Kirkpatrick MD               Patient Information   Date:4/10/2023   Name : Nori Alonso. 68 y.o.       YOB: 1950           Referred by: Richard Gross MD            Chief Complaint   Patient presents with    Diabetes    Thyroid Problem               History of Present Illness: Nori Orourke is a  68 y.o. male here for follow-up of  Type 2 Diabetes Mellitus. Type 2 Diabetes was diagnosed 2015. Cardiovascular risk factors: dyslipidemia, diabetes mellitus    No severe hypoglycemia  Tachycardia, was started on metoprolol, asymptomatic     He has quit sodas  No excess caffeine   No chest pain, shortness of breath, blurred vision         Hypothyroidism:  On levothyroxine           Physical Examination:      General: pleasant, no distress, good eye contact    HEENT: no exophthalmos, no periorbital edema, EOMI    Neck: No thyromegaly    CVS: S1-S2 regular, tachycardia    RS: Normal respiratory effort    Musculoskeletal: no tremors    Neurological: alert and oriented    Psychiatric: normal mood and affect    Skin: Normal color        Diabetic foot exam: September 2023      Left:      Vibratory sensation normal     Filament test normal sensation with micro filament    Pulse DP: 1+     Deformities: None   Right:     Vibratory sensation normal    Filament test normal sensation with micro filament    Pulse DP: 1+    Deformities: None      Data Reviewed:             Lab Results   Component Value Date    GFRAA 87 01/31/2022        Lab Results   Component Value Date    LABA1C 6.1 (H) 08/04/2022    LABA1C 7.4 (H) 01/31/2022    LABA1C 7.4 (H) 09/30/2021         Lab Results   Component Value Date     08/04/2022    K 3.8 08/04/2022     08/04/2022    CO2 28 08/04/2022    BUN 14 08/04/2022    CREATININE 0.97 08/04/2022    GFRAA 87 01/31/2022    GLUCOSE 99 08/04/2022    CALCIUM 9.7 08/04/2022    MALBCR <6 01/31/2022

## 2023-11-21 LAB — HBA1C MFR BLD HPLC: 6.3 %

## 2023-12-04 ENCOUNTER — OFFICE VISIT (OUTPATIENT)
Age: 73
End: 2023-12-04
Payer: MEDICARE

## 2023-12-04 VITALS
HEART RATE: 130 BPM | DIASTOLIC BLOOD PRESSURE: 102 MMHG | HEIGHT: 68 IN | SYSTOLIC BLOOD PRESSURE: 144 MMHG | TEMPERATURE: 97.4 F | BODY MASS INDEX: 29.66 KG/M2 | WEIGHT: 195.7 LBS | OXYGEN SATURATION: 96 % | RESPIRATION RATE: 17 BRPM

## 2023-12-04 DIAGNOSIS — R00.0 TACHYCARDIA: ICD-10-CM

## 2023-12-04 DIAGNOSIS — I10 ESSENTIAL HYPERTENSION: ICD-10-CM

## 2023-12-04 DIAGNOSIS — E78.2 MIXED HYPERLIPIDEMIA: ICD-10-CM

## 2023-12-04 DIAGNOSIS — E11.65 TYPE 2 DIABETES MELLITUS WITH HYPERGLYCEMIA, UNSPECIFIED WHETHER LONG TERM INSULIN USE (HCC): Primary | ICD-10-CM

## 2023-12-04 PROBLEM — E55.9 VITAMIN D DEFICIENCY: Status: ACTIVE | Noted: 2023-04-06

## 2023-12-04 PROBLEM — N25.9: Status: ACTIVE | Noted: 2023-04-06

## 2023-12-04 PROBLEM — E11.21 DIABETIC NEPHROPATHY ASSOCIATED WITH TYPE 2 DIABETES MELLITUS (HCC): Status: ACTIVE | Noted: 2023-04-06

## 2023-12-04 PROBLEM — I11.9 HYPERTENSIVE HEART DISEASE WITHOUT CONGESTIVE HEART FAILURE: Status: ACTIVE | Noted: 2023-04-06

## 2023-12-04 PROCEDURE — 3080F DIAST BP >= 90 MM HG: CPT | Performed by: INTERNAL MEDICINE

## 2023-12-04 PROCEDURE — 3077F SYST BP >= 140 MM HG: CPT | Performed by: INTERNAL MEDICINE

## 2023-12-04 PROCEDURE — 1123F ACP DISCUSS/DSCN MKR DOCD: CPT | Performed by: INTERNAL MEDICINE

## 2023-12-04 PROCEDURE — 99215 OFFICE O/P EST HI 40 MIN: CPT | Performed by: INTERNAL MEDICINE

## 2023-12-04 NOTE — PROGRESS NOTES
Elver Valentin MD               Patient Information   Date:4/10/2023   Name : Jerrica Tate. 68 y.o.       YOB: 1950           Referred by: John Danielle MD            Chief Complaint   Patient presents with    Diabetes               History of Present Illness: Jerrica Dave is a  68 y.o. male here for follow-up of  Type 2 Diabetes Mellitus. Type 2 Diabetes was diagnosed 2015. Cardiovascular risk factors: dyslipidemia, diabetes mellitus    No severe hypoglycemia      Had quit sodas . , had a soda today [employer] just before coming to the office, blood pressure was slightly elevated, tachycardic  On metoprolol half a tablet, seen cardiology in the past   No chest pain, shortness of breath, blurred vision         Hypothyroidism:  On levothyroxine           Physical Examination:      General: pleasant, no distress, good eye contact    HEENT: no exophthalmos, no periorbital edema, EOMI    Neck: No thyromegaly    CVS: S1-S2 regular, tachycardia    RS: Normal respiratory effort    Musculoskeletal: no tremors    Neurological: alert and oriented    Psychiatric: normal mood and affect    Skin: Normal color        Diabetic foot exam: September 2023      Left:      Vibratory sensation normal     Filament test normal sensation with micro filament    Pulse DP: 1+     Deformities: None   Right:     Vibratory sensation normal    Filament test normal sensation with micro filament    Pulse DP: 1+    Deformities: None      Data Reviewed:             Lab Results   Component Value Date    GFRAA 87 01/31/2022        Lab Results   Component Value Date    LABA1C 6.1 (H) 08/04/2022    LABA1C 7.4 (H) 01/31/2022    LABA1C 7.4 (H) 09/30/2021         Lab Results   Component Value Date     08/04/2022    K 3.8 08/04/2022     08/04/2022    CO2 28 08/04/2022    BUN 14 08/04/2022    CREATININE 0.97 08/04/2022    GFRAA 87 01/31/2022

## 2024-02-22 DIAGNOSIS — I10 ESSENTIAL HYPERTENSION: ICD-10-CM

## 2024-02-22 DIAGNOSIS — E11.65 TYPE 2 DIABETES MELLITUS WITH HYPERGLYCEMIA, UNSPECIFIED WHETHER LONG TERM INSULIN USE (HCC): ICD-10-CM

## 2024-02-22 DIAGNOSIS — E78.2 MIXED HYPERLIPIDEMIA: ICD-10-CM

## 2024-04-25 DIAGNOSIS — E78.2 MIXED HYPERLIPIDEMIA: ICD-10-CM

## 2024-04-25 DIAGNOSIS — E11.65 TYPE 2 DIABETES MELLITUS WITH HYPERGLYCEMIA, UNSPECIFIED WHETHER LONG TERM INSULIN USE (HCC): Primary | ICD-10-CM

## 2024-04-26 ENCOUNTER — NURSE ONLY (OUTPATIENT)
Age: 74
End: 2024-04-26

## 2024-04-26 DIAGNOSIS — E78.2 MIXED HYPERLIPIDEMIA: ICD-10-CM

## 2024-04-26 DIAGNOSIS — E11.65 TYPE 2 DIABETES MELLITUS WITH HYPERGLYCEMIA, UNSPECIFIED WHETHER LONG TERM INSULIN USE (HCC): ICD-10-CM

## 2024-05-01 LAB
ALBUMIN SERPL-MCNC: 3.6 G/DL (ref 3.5–5)
ALBUMIN/GLOB SERPL: 1 (ref 1.1–2.2)
ALP SERPL-CCNC: 109 U/L (ref 45–117)
ALT SERPL-CCNC: 34 U/L (ref 12–78)
ANION GAP SERPL CALC-SCNC: 7 MMOL/L (ref 5–15)
AST SERPL-CCNC: 32 U/L (ref 15–37)
BILIRUB SERPL-MCNC: 0.9 MG/DL (ref 0.2–1)
BUN SERPL-MCNC: 14 MG/DL (ref 6–20)
BUN/CREAT SERPL: 13 (ref 12–20)
CALCIUM SERPL-MCNC: 9.1 MG/DL (ref 8.5–10.1)
CHLORIDE SERPL-SCNC: 105 MMOL/L (ref 97–108)
CHOLEST SERPL-MCNC: 134 MG/DL
CO2 SERPL-SCNC: 30 MMOL/L (ref 21–32)
CREAT SERPL-MCNC: 1.07 MG/DL (ref 0.7–1.3)
CREAT UR-MCNC: 138 MG/DL
EST. AVERAGE GLUCOSE BLD GHB EST-MCNC: 131 MG/DL
GLOBULIN SER CALC-MCNC: 3.7 G/DL (ref 2–4)
GLUCOSE SERPL-MCNC: 168 MG/DL (ref 65–100)
HBA1C MFR BLD: 6.2 % (ref 4–5.6)
HDLC SERPL-MCNC: 47 MG/DL
HDLC SERPL: 2.9 (ref 0–5)
LDLC SERPL CALC-MCNC: 74 MG/DL (ref 0–100)
MICROALBUMIN UR-MCNC: 2.5 MG/DL
MICROALBUMIN/CREAT UR-RTO: 18 MG/G (ref 0–30)
POTASSIUM SERPL-SCNC: 3.5 MMOL/L (ref 3.5–5.1)
PROT SERPL-MCNC: 7.3 G/DL (ref 6.4–8.2)
SODIUM SERPL-SCNC: 142 MMOL/L (ref 136–145)
TRIGL SERPL-MCNC: 65 MG/DL
VLDLC SERPL CALC-MCNC: 13 MG/DL

## 2024-05-03 ENCOUNTER — TELEPHONE (OUTPATIENT)
Age: 74
End: 2024-05-03

## 2024-05-03 ENCOUNTER — OFFICE VISIT (OUTPATIENT)
Age: 74
End: 2024-05-03

## 2024-05-03 VITALS
OXYGEN SATURATION: 98 % | HEIGHT: 68 IN | SYSTOLIC BLOOD PRESSURE: 129 MMHG | WEIGHT: 194 LBS | DIASTOLIC BLOOD PRESSURE: 81 MMHG | TEMPERATURE: 97.5 F | HEART RATE: 74 BPM | RESPIRATION RATE: 18 BRPM | BODY MASS INDEX: 29.4 KG/M2

## 2024-05-03 DIAGNOSIS — E11.65 TYPE 2 DIABETES MELLITUS WITH HYPERGLYCEMIA, WITHOUT LONG-TERM CURRENT USE OF INSULIN (HCC): Primary | ICD-10-CM

## 2024-05-03 DIAGNOSIS — I10 ESSENTIAL HYPERTENSION: ICD-10-CM

## 2024-05-03 DIAGNOSIS — E78.2 MIXED HYPERLIPIDEMIA: ICD-10-CM

## 2024-05-03 RX ORDER — FUROSEMIDE 20 MG/1
20 TABLET ORAL DAILY
COMMUNITY

## 2024-05-03 RX ORDER — METOPROLOL SUCCINATE 50 MG/1
TABLET, EXTENDED RELEASE ORAL
COMMUNITY
Start: 2024-03-18

## 2024-05-03 RX ORDER — EMPAGLIFLOZIN, METFORMIN HYDROCHLORIDE 10; 1000 MG/1; MG/1
TABLET, EXTENDED RELEASE ORAL
Qty: 30 TABLET | Refills: 4 | Status: SHIPPED | OUTPATIENT
Start: 2024-05-03

## 2024-05-03 RX ORDER — LEVOTHYROXINE SODIUM 0.1 MG/1
100 TABLET ORAL DAILY
COMMUNITY
Start: 2024-02-09

## 2024-05-03 RX ORDER — AMLODIPINE BESYLATE 5 MG/1
TABLET ORAL
COMMUNITY

## 2024-05-03 RX ORDER — LOSARTAN POTASSIUM AND HYDROCHLOROTHIAZIDE 25; 100 MG/1; MG/1
1 TABLET ORAL DAILY
COMMUNITY
Start: 2024-04-15 | End: 2024-06-01

## 2024-05-03 RX ORDER — BLOOD-GLUCOSE METER
EACH MISCELLANEOUS
Qty: 1 KIT | Refills: 0 | Status: SHIPPED | OUTPATIENT
Start: 2024-05-03

## 2024-05-03 NOTE — PROGRESS NOTES
Chief Complaint   Patient presents with    Diabetes    Thyroid Problem    Follow-up        /81 (Site: Right Upper Arm, Position: Sitting, Cuff Size: Medium Adult)   Pulse 74   Temp 97.5 °F (36.4 °C) (Temporal)   Resp 18   Ht 1.727 m (5' 8\")   Wt 88 kg (194 lb)   SpO2 98%   BMI 29.50 kg/m²      1. Have you been to the ER, urgent care clinic since your last visit?  Hospitalized since your last visit?No    2. Have you seen or consulted any other health care providers outside of the [facility] since your last visit?  Include any pap smears or colon screening. No

## 2024-05-03 NOTE — TELEPHONE ENCOUNTER
Patient called back stating synjardy is covered by drug plan and can send to pharmacy verified on file

## 2024-05-03 NOTE — PROGRESS NOTES
[facility] DIABETES AND ENDOCRINOLOGY                 Jeannine Norton MD               Patient Information   Date:4/10/2023   Name : Ralf Ragland Jr. 73 y.o.       YOB: 1950           Referred by: Vipul Damon MD            Chief Complaint   Patient presents with    Diabetes    Thyroid Problem    Follow-up               History of Present Illness: Ralf Ragland Jr. is here for follow-up of  Type 2 Diabetes Mellitus.       Type 2 Diabetes was diagnosed 2015.    Cardiovascular risk factors: dyslipidemia, diabetes mellitus    No severe hypoglycemia  Seen cardiology for palpitations, diagnosed with CHF, asymptomatic, he was recommended defibrillator which he is debating    Very active  On beta-blocker  No chest pain, shortness of breath         Hypothyroidism: On levothyroxine           Physical Examination:      General: pleasant, no distress, good eye contact    HEENT: no exophthalmos, no periorbital edema, EOMI    Neck: No thyromegaly    CVS: S1-S2 regular, tachycardia    RS: Normal respiratory effort    Musculoskeletal: no tremors    Neurological: alert and oriented    Psychiatric: normal mood and affect    Skin: Normal color        Diabetic foot exam: September 2023      Left:      Vibratory sensation normal     Filament test normal sensation with micro filament    Pulse DP: 1+     Deformities: None   Right:     Vibratory sensation normal    Filament test normal sensation with micro filament    Pulse DP: 1+    Deformities: None      Data Reviewed:             Lab Results   Component Value Date    GFRAA 87 01/31/2022        Lab Results   Component Value Date    LABA1C 6.2 (H) 04/26/2024    LABA1C 6.1 (H) 08/04/2022    LABA1C 7.4 (H) 01/31/2022         Lab Results   Component Value Date    TRIG 65 04/26/2024    HDL 47 04/26/2024     04/26/2024    K 3.5 04/26/2024     04/26/2024    CO2 30 04/26/2024    BUN 14 04/26/2024    CREATININE 1.07 04/26/2024    GFRAA 87 01/31/2022

## 2024-05-16 DIAGNOSIS — E11.65 TYPE 2 DIABETES MELLITUS WITH HYPERGLYCEMIA, WITHOUT LONG-TERM CURRENT USE OF INSULIN (HCC): Primary | ICD-10-CM

## 2024-05-16 RX ORDER — DIPHENHYDRAMINE HCL 25 MG
TABLET ORAL
Qty: 1 KIT | Refills: 0 | Status: SHIPPED | OUTPATIENT
Start: 2024-05-16

## 2024-05-16 RX ORDER — ISOPROPYL ALCOHOL 0.75 G/1
SWAB TOPICAL
Qty: 100 EACH | Refills: 3 | Status: SHIPPED | OUTPATIENT
Start: 2024-05-16

## 2024-05-16 RX ORDER — GLUCOSAM/CHON-MSM1/C/MANG/BOSW 500-416.6
TABLET ORAL
Qty: 100 EACH | Refills: 3 | Status: SHIPPED | OUTPATIENT
Start: 2024-05-16

## 2024-05-16 RX ORDER — CALCIUM CITRATE/VITAMIN D3 200MG-6.25
TABLET ORAL
Qty: 100 STRIP | Refills: 3 | Status: SHIPPED | OUTPATIENT
Start: 2024-05-16

## 2024-06-03 RX ORDER — SITAGLIPTIN AND METFORMIN HYDROCHLORIDE 1000; 100 MG/1; MG/1
TABLET, FILM COATED, EXTENDED RELEASE ORAL
Qty: 90 TABLET | Refills: 0 | OUTPATIENT
Start: 2024-06-03

## 2024-06-06 LAB — HBA1C MFR BLD HPLC: 7.1 %

## 2024-06-07 ENCOUNTER — TELEPHONE (OUTPATIENT)
Age: 74
End: 2024-06-07

## 2024-06-07 DIAGNOSIS — E11.65 TYPE 2 DIABETES MELLITUS WITH HYPERGLYCEMIA, WITHOUT LONG-TERM CURRENT USE OF INSULIN (HCC): ICD-10-CM

## 2024-06-07 RX ORDER — EMPAGLIFLOZIN, METFORMIN HYDROCHLORIDE 10; 1000 MG/1; MG/1
TABLET, EXTENDED RELEASE ORAL
Qty: 90 TABLET | Refills: 4 | Status: SHIPPED | OUTPATIENT
Start: 2024-06-07

## 2024-07-17 PROBLEM — L20.9 ATOPIC DERMATITIS: Status: ACTIVE | Noted: 2024-01-05

## 2024-08-07 ENCOUNTER — OFFICE VISIT (OUTPATIENT)
Age: 74
End: 2024-08-07

## 2024-08-07 VITALS
DIASTOLIC BLOOD PRESSURE: 75 MMHG | TEMPERATURE: 98 F | BODY MASS INDEX: 30.51 KG/M2 | HEART RATE: 81 BPM | SYSTOLIC BLOOD PRESSURE: 130 MMHG | OXYGEN SATURATION: 97 % | WEIGHT: 201.3 LBS | HEIGHT: 68 IN

## 2024-08-07 DIAGNOSIS — E11.65 TYPE 2 DIABETES MELLITUS WITH HYPERGLYCEMIA, WITHOUT LONG-TERM CURRENT USE OF INSULIN (HCC): Primary | ICD-10-CM

## 2024-08-07 DIAGNOSIS — E11.65 TYPE 2 DIABETES MELLITUS WITH HYPERGLYCEMIA, WITHOUT LONG-TERM CURRENT USE OF INSULIN (HCC): ICD-10-CM

## 2024-08-07 DIAGNOSIS — E78.2 MIXED HYPERLIPIDEMIA: ICD-10-CM

## 2024-08-07 DIAGNOSIS — I10 ESSENTIAL HYPERTENSION: ICD-10-CM

## 2024-08-07 RX ORDER — GLUCOSAM/CHON-MSM1/C/MANG/BOSW 500-416.6
TABLET ORAL
Qty: 100 EACH | Refills: 3 | Status: SHIPPED | OUTPATIENT
Start: 2024-08-07

## 2024-08-07 RX ORDER — CALCIUM CITRATE/VITAMIN D3 200MG-6.25
TABLET ORAL
Qty: 100 STRIP | Refills: 3 | Status: SHIPPED | OUTPATIENT
Start: 2024-08-07

## 2024-08-07 RX ORDER — EMPAGLIFLOZIN, METFORMIN HYDROCHLORIDE 10; 1000 MG/1; MG/1
TABLET, EXTENDED RELEASE ORAL
Qty: 90 TABLET | Refills: 4 | Status: SHIPPED | OUTPATIENT
Start: 2024-08-07

## 2024-08-07 NOTE — PROGRESS NOTES
[facility] DIABETES AND ENDOCRINOLOGY                 Jeannine Norton MD               Patient Information     Name : Ralf Ragland JrShobha      YOB: 1950           Referred by: Vipul Damon MD            Chief Complaint   Patient presents with    Diabetes               History of Present Illness: Ralf Ragland Jr. is here for follow-up of  Type 2 Diabetes Mellitus.       Type 2 Diabetes was diagnosed 2015.    Cardiovascular risk factors: dyslipidemia, diabetes mellitus    No severe hypoglycemia    Nonischemic cardiomyopathy, EF 25%, seen cardiology, recommended AICD  Asymptomatic  No chest pain, shortness of breath         Hypothyroidism: On levothyroxine           Physical Examination:      General: pleasant, no distress, good eye contact    HEENT: no exophthalmos, no periorbital edema, EOMI    Neck: No thyromegaly    CVS: S1-S2 regular, tachycardia    RS: Normal respiratory effort    Musculoskeletal: no tremors    Neurological: alert and oriented    Psychiatric: normal mood and affect    Skin: Normal color        Diabetic foot exam: September 2023      Left:      Vibratory sensation normal     Filament test normal sensation with micro filament    Pulse DP: 1+     Deformities: None   Right:     Vibratory sensation normal    Filament test normal sensation with micro filament    Pulse DP: 1+    Deformities: None      Data Reviewed:             Lab Results   Component Value Date    GFRAA 87 01/31/2022        Lab Results   Component Value Date    LABA1C 6.2 (H) 04/26/2024    LABA1C 6.1 (H) 08/04/2022    LABA1C 7.4 (H) 01/31/2022         Lab Results   Component Value Date    TRIG 65 04/26/2024    HDL 47 04/26/2024     04/26/2024    K 3.5 04/26/2024     04/26/2024    CO2 30 04/26/2024    BUN 14 04/26/2024    CREATININE 1.07 04/26/2024    GFRAA 87 01/31/2022    GLUCOSE 168 (H) 04/26/2024    CALCIUM 9.1 04/26/2024    MALBCR 18 04/26/2024                    Assessment/Plan:

## 2024-09-07 LAB — HBA1C MFR BLD HPLC: 7.2 %

## 2024-12-10 ENCOUNTER — OFFICE VISIT (OUTPATIENT)
Age: 74
End: 2024-12-10

## 2024-12-10 VITALS
SYSTOLIC BLOOD PRESSURE: 114 MMHG | WEIGHT: 202 LBS | HEART RATE: 90 BPM | RESPIRATION RATE: 18 BRPM | HEIGHT: 68 IN | DIASTOLIC BLOOD PRESSURE: 73 MMHG | OXYGEN SATURATION: 96 % | BODY MASS INDEX: 30.62 KG/M2 | TEMPERATURE: 98.1 F

## 2024-12-10 DIAGNOSIS — E11.65 TYPE 2 DIABETES MELLITUS WITH HYPERGLYCEMIA, WITHOUT LONG-TERM CURRENT USE OF INSULIN (HCC): Primary | ICD-10-CM

## 2024-12-10 DIAGNOSIS — E78.2 MIXED HYPERLIPIDEMIA: ICD-10-CM

## 2024-12-10 DIAGNOSIS — I10 ESSENTIAL HYPERTENSION: ICD-10-CM

## 2024-12-10 RX ORDER — POTASSIUM CHLORIDE 1500 MG/1
1 TABLET, EXTENDED RELEASE ORAL DAILY
COMMUNITY
Start: 2024-09-09

## 2024-12-10 RX ORDER — SPIRONOLACTONE 25 MG/1
25 TABLET ORAL DAILY
COMMUNITY
Start: 2024-12-04

## 2024-12-10 RX ORDER — EMPAGLIFLOZIN, METFORMIN HYDROCHLORIDE 10; 1000 MG/1; MG/1
TABLET, EXTENDED RELEASE ORAL
Qty: 90 TABLET | Refills: 4 | Status: SHIPPED | OUTPATIENT
Start: 2024-12-10

## 2024-12-10 NOTE — PROGRESS NOTES
Ralf Ragland Jr. is a 74 y.o. male here for   Chief Complaint   Patient presents with    Diabetes       1. Have you been to the ER, urgent care clinic since your last visit?  Hospitalized since your last visit? -no    2. Have you seen or consulted any other health care providers outside of the [facility] since your last visit?  Include any pap smears or colon screening.-cardiology

## 2024-12-10 NOTE — PROGRESS NOTES
[facility] DIABETES AND ENDOCRINOLOGY                 Jeannine Norton MD               Patient Information     Name : Ralf Ragland Jr.      YOB: 1950           Referred by: Vipul Damon MD         The patient (or guardian, if applicable) and other individuals in attendance with the patient were advised that Artificial Intelligence will be utilized during this visit to record, process the conversation to generate a clinical note, and support improvement of the AI technology. The patient (or guardian, if applicable) and other individuals in attendance at the appointment consented to the use of AI, including the recording.       Chief Complaint   Patient presents with    Diabetes              Ralf Ragland Jr. is here for follow-up of  Type 2 Diabetes Mellitus.     Type 2 Diabetes was diagnosed 2015.     History of Present Illness  74-year-old male presents for diabetes, heart condition, and cholesterol management.    Reports increased blood glucose,  Increased appetite but maintains a balanced diet. Requests Synjardy refill to [facility]. On Synjardy for diabetes.    Limited physical activity due to cardiac condition, engages in some walking. No shortness of breath, chest pain, or edema, but occasional chest tightness. Consumes 16 ounces of water before bedtime. No defibrillator implant, under cardiologist care.                Physical Examination:      General: pleasant, no distress, good eye contact    HEENT: no exophthalmos, no periorbital edema, EOMI    Neck: No thyromegaly    CVS: S1-S2 regular, tachycardia    RS: Normal respiratory effort    Musculoskeletal: no tremors    Neurological: alert and oriented    Psychiatric: normal mood and affect    Skin: Normal color        Diabetic foot exam: September 2023      Left:      Vibratory sensation normal     Filament test normal sensation with micro filament    Pulse DP: 1+     Deformities: None   Right:     Vibratory sensation normal

## 2025-04-02 PROBLEM — I48.3 TYPICAL ATRIAL FLUTTER (HCC): Status: ACTIVE | Noted: 2024-06-05

## 2025-04-02 PROBLEM — I50.22 CHRONIC SYSTOLIC HEART FAILURE (HCC): Status: ACTIVE | Noted: 2024-06-05

## 2025-04-02 PROBLEM — I42.8 NICM (NONISCHEMIC CARDIOMYOPATHY) (HCC): Status: ACTIVE | Noted: 2024-09-25

## 2025-04-15 ENCOUNTER — LAB (OUTPATIENT)
Age: 75
End: 2025-04-15

## 2025-04-15 DIAGNOSIS — E11.65 TYPE 2 DIABETES MELLITUS WITH HYPERGLYCEMIA, WITHOUT LONG-TERM CURRENT USE OF INSULIN (HCC): ICD-10-CM

## 2025-04-15 DIAGNOSIS — E78.2 MIXED HYPERLIPIDEMIA: ICD-10-CM

## 2025-04-15 DIAGNOSIS — I10 ESSENTIAL HYPERTENSION: ICD-10-CM

## 2025-04-15 LAB
ANION GAP SERPL CALC-SCNC: 3 MMOL/L (ref 2–12)
BUN SERPL-MCNC: 15 MG/DL (ref 6–20)
BUN/CREAT SERPL: 13 (ref 12–20)
CALCIUM SERPL-MCNC: 9.1 MG/DL (ref 8.5–10.1)
CHLORIDE SERPL-SCNC: 110 MMOL/L (ref 97–108)
CO2 SERPL-SCNC: 27 MMOL/L (ref 21–32)
CREAT SERPL-MCNC: 1.12 MG/DL (ref 0.7–1.3)
CREAT UR-MCNC: 154 MG/DL
EST. AVERAGE GLUCOSE BLD GHB EST-MCNC: 140 MG/DL
GLUCOSE SERPL-MCNC: 123 MG/DL (ref 65–100)
HBA1C MFR BLD: 6.5 % (ref 4–5.6)
LDLC SERPL DIRECT ASSAY-MCNC: 91 MG/DL (ref 0–100)
MICROALBUMIN UR-MCNC: 2.88 MG/DL
MICROALBUMIN/CREAT UR-RTO: 19 MG/G (ref 0–30)
POTASSIUM SERPL-SCNC: 3.8 MMOL/L (ref 3.5–5.1)
SODIUM SERPL-SCNC: 140 MMOL/L (ref 136–145)

## 2025-04-22 ENCOUNTER — OFFICE VISIT (OUTPATIENT)
Age: 75
End: 2025-04-22
Payer: MEDICARE

## 2025-04-22 VITALS
OXYGEN SATURATION: 94 % | HEART RATE: 74 BPM | HEIGHT: 68 IN | DIASTOLIC BLOOD PRESSURE: 66 MMHG | SYSTOLIC BLOOD PRESSURE: 106 MMHG | BODY MASS INDEX: 30.43 KG/M2 | TEMPERATURE: 97.9 F | WEIGHT: 200.8 LBS

## 2025-04-22 DIAGNOSIS — I10 ESSENTIAL HYPERTENSION: ICD-10-CM

## 2025-04-22 DIAGNOSIS — E11.65 TYPE 2 DIABETES MELLITUS WITH HYPERGLYCEMIA, WITHOUT LONG-TERM CURRENT USE OF INSULIN (HCC): Primary | ICD-10-CM

## 2025-04-22 DIAGNOSIS — E78.2 MIXED HYPERLIPIDEMIA: ICD-10-CM

## 2025-04-22 PROCEDURE — 3074F SYST BP LT 130 MM HG: CPT | Performed by: INTERNAL MEDICINE

## 2025-04-22 PROCEDURE — 3017F COLORECTAL CA SCREEN DOC REV: CPT | Performed by: INTERNAL MEDICINE

## 2025-04-22 PROCEDURE — 1126F AMNT PAIN NOTED NONE PRSNT: CPT | Performed by: INTERNAL MEDICINE

## 2025-04-22 PROCEDURE — 3078F DIAST BP <80 MM HG: CPT | Performed by: INTERNAL MEDICINE

## 2025-04-22 PROCEDURE — 1123F ACP DISCUSS/DSCN MKR DOCD: CPT | Performed by: INTERNAL MEDICINE

## 2025-04-22 PROCEDURE — 3044F HG A1C LEVEL LT 7.0%: CPT | Performed by: INTERNAL MEDICINE

## 2025-04-22 PROCEDURE — G8427 DOCREV CUR MEDS BY ELIG CLIN: HCPCS | Performed by: INTERNAL MEDICINE

## 2025-04-22 PROCEDURE — G8417 CALC BMI ABV UP PARAM F/U: HCPCS | Performed by: INTERNAL MEDICINE

## 2025-04-22 PROCEDURE — G2211 COMPLEX E/M VISIT ADD ON: HCPCS | Performed by: INTERNAL MEDICINE

## 2025-04-22 PROCEDURE — 99214 OFFICE O/P EST MOD 30 MIN: CPT | Performed by: INTERNAL MEDICINE

## 2025-04-22 PROCEDURE — 2022F DILAT RTA XM EVC RTNOPTHY: CPT | Performed by: INTERNAL MEDICINE

## 2025-04-22 PROCEDURE — 1159F MED LIST DOCD IN RCRD: CPT | Performed by: INTERNAL MEDICINE

## 2025-04-22 PROCEDURE — 1036F TOBACCO NON-USER: CPT | Performed by: INTERNAL MEDICINE

## 2025-04-22 PROCEDURE — 1160F RVW MEDS BY RX/DR IN RCRD: CPT | Performed by: INTERNAL MEDICINE

## 2025-04-22 RX ORDER — METOPROLOL SUCCINATE 100 MG/1
100 TABLET, EXTENDED RELEASE ORAL DAILY
COMMUNITY
Start: 2025-01-30

## 2025-04-22 RX ORDER — APIXABAN 5 MG/1
5 TABLET, FILM COATED ORAL 2 TIMES DAILY
COMMUNITY
Start: 2025-04-14

## 2025-04-22 NOTE — PROGRESS NOTES
Ralf Ragland Jr. is a 75 y.o. male here for   Chief Complaint   Patient presents with    Diabetes       1. Have you been to the ER, urgent care clinic since your last visit?  Hospitalized since your last visit? - no    2. Have you seen or consulted any other health care providers outside of the [facility] since your last visit?  Include any pap smears or colon screening.- [facility]- Foot pain

## 2025-04-22 NOTE — PROGRESS NOTES
[facility] DIABETES AND ENDOCRINOLOGY                 Jeannine Norton MD               Patient Information     Name : Ralf Ragland Jr.      YOB: 1950           Referred by: Vipul Damon MD         The patient (or guardian, if applicable) and other individuals in attendance with the patient were advised that Artificial Intelligence will be utilized during this visit to record, process the conversation to generate a clinical note, and support improvement of the AI technology. The patient (or guardian, if applicable) and other individuals in attendance at the appointment consented to the use of AI, including the recording.       Chief Complaint   Patient presents with    Diabetes              Ralf Ragland Jr. is here for follow-up of  Type 2 Diabetes Mellitus.     Type 2 Diabetes was diagnosed 2015.     History of Present Illness    The patient is a 75-year-old male with diabetes mellitus type 2, heel spur,     Diabetes mellitus type 2 is managed with Synjardy. Blood glucose levels are consistently below 150, with A1c at 6.5.  Vitamin B12 supplements prescribed but not started. No neuropathy, calluses, or open sores.    Heel pain managed with cortisone injection, no significant increase in blood glucose.    AICD pacemaker and defibrillator  No chest pain or ankle swelling. On anticoagulant therapy.               Physical Examination:      General: pleasant, no distress, good eye contact    HEENT: no exophthalmos, no periorbital edema, EOMI    Neck: No thyromegaly    CVS: S1-S2 regular, tachycardia    RS: Normal respiratory effort    Musculoskeletal: no tremors    Neurological: alert and oriented    Psychiatric: normal mood and affect    Skin: Normal color        Diabetic foot exam: April 2025       Left:      Vibratory sensation normal     Filament test normal sensation with micro filament    Pulse DP: 1+     Deformities: None   Right:     Vibratory sensation normal    Filament test

## 2025-08-13 ENCOUNTER — LAB (OUTPATIENT)
Age: 75
End: 2025-08-13

## 2025-08-13 DIAGNOSIS — E11.65 TYPE 2 DIABETES MELLITUS WITH HYPERGLYCEMIA, WITHOUT LONG-TERM CURRENT USE OF INSULIN (HCC): ICD-10-CM

## 2025-08-13 LAB
EST. AVERAGE GLUCOSE BLD GHB EST-MCNC: 144 MG/DL
HBA1C MFR BLD: 6.7 % (ref 4–5.6)

## 2025-08-20 ENCOUNTER — OFFICE VISIT (OUTPATIENT)
Age: 75
End: 2025-08-20
Payer: MEDICARE

## 2025-08-20 VITALS
TEMPERATURE: 98 F | SYSTOLIC BLOOD PRESSURE: 114 MMHG | HEIGHT: 68 IN | OXYGEN SATURATION: 95 % | HEART RATE: 76 BPM | BODY MASS INDEX: 30.87 KG/M2 | DIASTOLIC BLOOD PRESSURE: 72 MMHG | WEIGHT: 203.7 LBS

## 2025-08-20 DIAGNOSIS — E78.2 MIXED HYPERLIPIDEMIA: ICD-10-CM

## 2025-08-20 DIAGNOSIS — I10 ESSENTIAL HYPERTENSION: ICD-10-CM

## 2025-08-20 DIAGNOSIS — E11.65 TYPE 2 DIABETES MELLITUS WITH HYPERGLYCEMIA, WITHOUT LONG-TERM CURRENT USE OF INSULIN (HCC): Primary | ICD-10-CM

## 2025-08-20 PROCEDURE — 99214 OFFICE O/P EST MOD 30 MIN: CPT | Performed by: INTERNAL MEDICINE

## 2025-08-20 PROCEDURE — 3044F HG A1C LEVEL LT 7.0%: CPT | Performed by: INTERNAL MEDICINE

## 2025-08-20 PROCEDURE — 1036F TOBACCO NON-USER: CPT | Performed by: INTERNAL MEDICINE

## 2025-08-20 PROCEDURE — 1160F RVW MEDS BY RX/DR IN RCRD: CPT | Performed by: INTERNAL MEDICINE

## 2025-08-20 PROCEDURE — G8427 DOCREV CUR MEDS BY ELIG CLIN: HCPCS | Performed by: INTERNAL MEDICINE

## 2025-08-20 PROCEDURE — G2211 COMPLEX E/M VISIT ADD ON: HCPCS | Performed by: INTERNAL MEDICINE

## 2025-08-20 PROCEDURE — 1123F ACP DISCUSS/DSCN MKR DOCD: CPT | Performed by: INTERNAL MEDICINE

## 2025-08-20 PROCEDURE — 3074F SYST BP LT 130 MM HG: CPT | Performed by: INTERNAL MEDICINE

## 2025-08-20 PROCEDURE — 1126F AMNT PAIN NOTED NONE PRSNT: CPT | Performed by: INTERNAL MEDICINE

## 2025-08-20 PROCEDURE — 1159F MED LIST DOCD IN RCRD: CPT | Performed by: INTERNAL MEDICINE

## 2025-08-20 PROCEDURE — 3017F COLORECTAL CA SCREEN DOC REV: CPT | Performed by: INTERNAL MEDICINE

## 2025-08-20 PROCEDURE — 3078F DIAST BP <80 MM HG: CPT | Performed by: INTERNAL MEDICINE

## 2025-08-20 PROCEDURE — 2022F DILAT RTA XM EVC RTNOPTHY: CPT | Performed by: INTERNAL MEDICINE

## 2025-08-20 PROCEDURE — G8417 CALC BMI ABV UP PARAM F/U: HCPCS | Performed by: INTERNAL MEDICINE

## (undated) DEVICE — FCPS BX HOT RJ4 2.2MMX240CM -- RADIAL JAW 4 BX/40

## (undated) DEVICE — SNARE ENDOSCP POLYP MED STD AD 2.4X27X240 CM 2.8 MM OVL SENS

## (undated) DEVICE — REM POLYHESIVE ADULT PATIENT RETURN ELECTRODE: Brand: VALLEYLAB